# Patient Record
Sex: FEMALE | Race: WHITE | NOT HISPANIC OR LATINO | Employment: FULL TIME | URBAN - METROPOLITAN AREA
[De-identification: names, ages, dates, MRNs, and addresses within clinical notes are randomized per-mention and may not be internally consistent; named-entity substitution may affect disease eponyms.]

---

## 2019-01-10 ENCOUNTER — OFFICE VISIT (OUTPATIENT)
Dept: URGENT CARE | Facility: CLINIC | Age: 59
End: 2019-01-10
Payer: COMMERCIAL

## 2019-01-10 VITALS
HEIGHT: 61 IN | SYSTOLIC BLOOD PRESSURE: 120 MMHG | OXYGEN SATURATION: 98 % | TEMPERATURE: 98.7 F | RESPIRATION RATE: 18 BRPM | HEART RATE: 90 BPM | WEIGHT: 176 LBS | BODY MASS INDEX: 33.23 KG/M2 | DIASTOLIC BLOOD PRESSURE: 78 MMHG

## 2019-01-10 DIAGNOSIS — J01.10 ACUTE NON-RECURRENT FRONTAL SINUSITIS: Primary | ICD-10-CM

## 2019-01-10 PROCEDURE — 99203 OFFICE O/P NEW LOW 30 MIN: CPT | Performed by: PHYSICIAN ASSISTANT

## 2019-01-10 RX ORDER — CETIRIZINE HYDROCHLORIDE 10 MG/1
10 TABLET ORAL DAILY
Qty: 30 TABLET | Refills: 0 | Status: SHIPPED | OUTPATIENT
Start: 2019-01-10

## 2019-01-10 RX ORDER — FLUTICASONE PROPIONATE 50 MCG
2 SPRAY, SUSPENSION (ML) NASAL DAILY
Qty: 16 G | Refills: 0 | Status: SHIPPED | OUTPATIENT
Start: 2019-01-10

## 2019-01-10 RX ORDER — AMOXICILLIN AND CLAVULANATE POTASSIUM 875; 125 MG/1; MG/1
1 TABLET, FILM COATED ORAL EVERY 12 HOURS SCHEDULED
Qty: 14 TABLET | Refills: 0 | Status: SHIPPED | OUTPATIENT
Start: 2019-01-10 | End: 2019-01-17

## 2019-01-10 NOTE — PROGRESS NOTES
[address]        NAME: Hailey Moore is a 62 y o  female  : 1960    MRN: [id number]  DATE: January 10, 2019  TIME: 2:44 PM    Assessment and Plan   Acute non-recurrent frontal sinusitis [J01 10]  1  Acute non-recurrent frontal sinusitis  amoxicillin-clavulanate (AUGMENTIN) 875-125 mg per tablet    fluticasone (FLONASE) 50 mcg/act nasal spray    cetirizine (ZyrTEC) 10 mg tablet         Patient Instructions     Take antibiotic as directed until completed  Motrin and/or Tylenol as needed for fevers or aches and pains  Use medication as directed for symptom relief  Follow up with PCP in 3-5 days  Proceed to  ER if symptoms worsen  Chief Complaint     Chief Complaint   Patient presents with    Nasal Congestion     Nasal/snus congestion, sore throat  Onset 2 weeks ago         History of Present Illness       51-year-old female presents with sinus pain and pressure  Patient reports symptoms started 2 weeks ago and have been waxing and waning over the past 2 weeks  Has been using multiple over-the-counter remedies but is not getting any better  Denies any fevers or chills  No chest pain shortness of breath or coughing  Sinusitis   This is a new problem  The current episode started 1 to 4 weeks ago  The problem has been waxing and waning since onset  There has been no fever  The pain is moderate  Associated symptoms include headaches, sinus pressure and a sore throat  Pertinent negatives include no congestion or coughing  The treatment provided no relief  Review of Systems   Review of Systems   Constitutional: Negative  HENT: Positive for sinus pressure and sore throat  Negative for congestion  Eyes: Negative  Respiratory: Negative  Negative for cough  Cardiovascular: Negative  Gastrointestinal: Negative  Musculoskeletal: Negative  Skin: Negative  Neurological: Positive for headaches           Current Medications       Current Outpatient Prescriptions:    amoxicillin-clavulanate (AUGMENTIN) 875-125 mg per tablet, Take 1 tablet by mouth every 12 (twelve) hours for 7 days, Disp: 14 tablet, Rfl: 0    cetirizine (ZyrTEC) 10 mg tablet, Take 1 tablet (10 mg total) by mouth daily, Disp: 30 tablet, Rfl: 0    fluticasone (FLONASE) 50 mcg/act nasal spray, 2 sprays into each nostril daily, Disp: 16 g, Rfl: 0    Current Allergies     Allergies as of 01/10/2019 - Reviewed 01/10/2019   Allergen Reaction Noted    Demerol [meperidine] Vomiting 01/10/2019            The following portions of the patient's history were reviewed and updated as appropriate: allergies, current medications, past family history, past medical history, past social history, past surgical history and problem list      Past Medical History:   Diagnosis Date    Known health problems: none        Past Surgical History:   Procedure Laterality Date    APPENDECTOMY      HYSTERECTOMY      MENISCECTOMY         History reviewed  No pertinent family history  Medications have been verified  Objective   /78   Pulse 90   Temp 98 7 °F (37 1 °C) (Tympanic)   Resp 18   Ht 5' 1" (1 549 m)   Wt 79 8 kg (176 lb)   SpO2 98%   BMI 33 25 kg/m²        Physical Exam     Physical Exam   Constitutional: She is oriented to person, place, and time  She appears well-developed and well-nourished  No distress  HENT:   Head: Normocephalic and atraumatic  Right Ear: External ear normal    Left Ear: External ear normal    Nose: Right sinus exhibits maxillary sinus tenderness and frontal sinus tenderness  Left sinus exhibits maxillary sinus tenderness and frontal sinus tenderness  Mouth/Throat: Oropharynx is clear and moist  No oropharyngeal exudate  Eyes: Conjunctivae are normal  Right eye exhibits no discharge  Left eye exhibits no discharge  Neck: Normal range of motion  Neck supple  Cardiovascular: Normal rate, regular rhythm, normal heart sounds and intact distal pulses      No murmur heard   Pulmonary/Chest: Effort normal and breath sounds normal  No respiratory distress  She has no wheezes  She has no rales  Abdominal: Soft  Bowel sounds are normal  There is no tenderness  Musculoskeletal: Normal range of motion  Lymphadenopathy:     She has no cervical adenopathy  Neurological: She is alert and oriented to person, place, and time  Skin: Skin is warm and dry  Psychiatric: She has a normal mood and affect  Nursing note and vitals reviewed

## 2019-01-10 NOTE — PATIENT INSTRUCTIONS
Take antibiotic as directed until completed  Motrin and/or Tylenol as needed for fevers or aches and pains  Use medication as directed for symptom relief  Follow up with PCP in 3-5 days  Proceed to  ER if symptoms worsen  Sinusitis   AMBULATORY CARE:   Sinusitis  is inflammation or infection of your sinuses  It is most often caused by a virus  Acute sinusitis may last up to 12 weeks  Chronic sinusitis lasts longer than 12 weeks  Recurrent sinusitis means you have 4 or more times in 1 year  Common symptoms include the following:   · Fever    · Pain, pressure, redness, or swelling around the forehead, cheeks, or eyes    · Thick yellow or green discharge from your nose    · Tenderness when you touch your face over your sinuses    · Dry cough that happens mostly at night or when you lie down    · Headache and face pain that is worse when you lean forward    · Tooth pain, or pain when you chew  Seek care immediately if:   · Your eye and eyelid are red, swollen, and painful  · You cannot open your eye  · You have vision changes, such as double vision  · Your eyeball bulges out or you cannot move your eye  · You are more sleepy than normal, or you notice changes in your ability to think, move, or talk  · You have a stiff neck, a fever, or a bad headache  · You have swelling of your forehead or scalp  Contact your healthcare provider if:   · Your symptoms do not improve after 3 days  · Your symptoms do not go away after 10 days  · You have nausea and are vomiting  · Your nose is bleeding  · You have questions or concerns about your condition or care  Treatment for sinusitis:  Your symptoms may go away on their own  Your healthcare provider may recommend watchful waiting for up to 10 days before starting antibiotics  You may  need any of the following:  · Acetaminophen  decreases pain and fever  It is available without a doctor's order  Ask how much to take and how often to take it  Follow directions  Read the labels of all other medicines you are using to see if they also contain acetaminophen, or ask your doctor or pharmacist  Acetaminophen can cause liver damage if not taken correctly  Do not use more than 4 grams (4,000 milligrams) total of acetaminophen in one day  · NSAIDs , such as ibuprofen, help decrease swelling, pain, and fever  This medicine is available with or without a doctor's order  NSAIDs can cause stomach bleeding or kidney problems in certain people  If you take blood thinner medicine, always ask your healthcare provider if NSAIDs are safe for you  Always read the medicine label and follow directions  · Nasal steroid sprays  may help decrease inflammation in your nose and sinuses  · Decongestants  help reduce swelling and drain mucus in the nose and sinuses  They may help you breathe easier  · Antihistamines  help dry mucus in the nose and relieve sneezing  · Antibiotics  help treat or prevent a bacterial infection  · Take your medicine as directed  Contact your healthcare provider if you think your medicine is not helping or if you have side effects  Tell him or her if you are allergic to any medicine  Keep a list of the medicines, vitamins, and herbs you take  Include the amounts, and when and why you take them  Bring the list or the pill bottles to follow-up visits  Carry your medicine list with you in case of an emergency  Self-care:   · Rinse your sinuses  Use a sinus rinse device to rinse your nasal passages with a saline (salt water) solution or distilled water  Do not use tap water  This will help thin the mucus in your nose and rinse away pollen and dirt  It will also help reduce swelling so you can breathe normally  Ask your healthcare provider how often to do this  · Breathe in steam   Heat a bowl of water until you see steam  Lean over the bowl and make a tent over your head with a large towel  Breathe deeply for about 20 minutes   Be careful not to get too close to the steam or burn yourself  Do this 3 times a day  You can also breathe deeply when you take a hot shower  · Sleep with your head elevated  Place an extra pillow under your head before you go to sleep to help your sinuses drain  · Drink liquids as directed  Ask your healthcare provider how much liquid to drink each day and which liquids are best for you  Liquids will thin the mucus in your nose and help it drain  Avoid drinks that contain alcohol or caffeine  · Do not smoke, and avoid secondhand smoke  Nicotine and other chemicals in cigarettes and cigars can make your symptoms worse  Ask your healthcare provider for information if you currently smoke and need help to quit  E-cigarettes or smokeless tobacco still contain nicotine  Talk to your healthcare provider before you use these products  Prevent the spread of germs that cause sinusitis:  Wash your hands often with soap and water  Wash your hands after you use the bathroom, change a child's diaper, or sneeze  Wash your hands before you prepare or eat food  Follow up with your healthcare provider as directed: You may be referred to an ear, nose, and throat specialist  Write down your questions so you remember to ask them during your visits  © 2017 [facility] Information is for End User's use only and may not be sold, redistributed or otherwise used for commercial purposes  All illustrations and images included in CareNotes® are the copyrighted property of A D A M [employer]  or Ив[employer]ham  The above information is an  only  It is not intended as medical advice for individual conditions or treatments  Talk to your doctor, nurse or pharmacist before following any medical regimen to see if it is safe and effective for you

## 2023-03-24 ENCOUNTER — ANESTHESIA EVENT (OUTPATIENT)
Dept: GASTROENTEROLOGY | Facility: HOSPITAL | Age: 63
End: 2023-03-24

## 2023-03-24 ENCOUNTER — HOSPITAL ENCOUNTER (OUTPATIENT)
Dept: GASTROENTEROLOGY | Facility: HOSPITAL | Age: 63
Setting detail: OUTPATIENT SURGERY
End: 2023-03-24
Attending: INTERNAL MEDICINE

## 2023-03-24 ENCOUNTER — ANESTHESIA (OUTPATIENT)
Dept: GASTROENTEROLOGY | Facility: HOSPITAL | Age: 63
End: 2023-03-24

## 2023-03-24 VITALS
RESPIRATION RATE: 20 BRPM | TEMPERATURE: 98 F | SYSTOLIC BLOOD PRESSURE: 115 MMHG | DIASTOLIC BLOOD PRESSURE: 67 MMHG | BODY MASS INDEX: 33.04 KG/M2 | WEIGHT: 175 LBS | HEIGHT: 61 IN | OXYGEN SATURATION: 99 % | HEART RATE: 76 BPM

## 2023-03-24 DIAGNOSIS — R68.81 EARLY SATIETY: ICD-10-CM

## 2023-03-24 DIAGNOSIS — K21.9 GASTRO-ESOPHAGEAL REFLUX DISEASE WITHOUT ESOPHAGITIS: ICD-10-CM

## 2023-03-24 DIAGNOSIS — R63.4 ABNORMAL WEIGHT LOSS: ICD-10-CM

## 2023-03-24 DIAGNOSIS — R14.0 BLOATING: ICD-10-CM

## 2023-03-24 DIAGNOSIS — R13.10 DYSPHAGIA, UNSPECIFIED: ICD-10-CM

## 2023-03-24 RX ORDER — SODIUM CHLORIDE, SODIUM LACTATE, POTASSIUM CHLORIDE, CALCIUM CHLORIDE 600; 310; 30; 20 MG/100ML; MG/100ML; MG/100ML; MG/100ML
INJECTION, SOLUTION INTRAVENOUS CONTINUOUS PRN
Status: DISCONTINUED | OUTPATIENT
Start: 2023-03-24 | End: 2023-03-24

## 2023-03-24 RX ORDER — SODIUM CHLORIDE, SODIUM LACTATE, POTASSIUM CHLORIDE, CALCIUM CHLORIDE 600; 310; 30; 20 MG/100ML; MG/100ML; MG/100ML; MG/100ML
75 INJECTION, SOLUTION INTRAVENOUS CONTINUOUS
Status: CANCELLED | OUTPATIENT
Start: 2023-03-24

## 2023-03-24 RX ORDER — PROPOFOL 10 MG/ML
INJECTION, EMULSION INTRAVENOUS AS NEEDED
Status: DISCONTINUED | OUTPATIENT
Start: 2023-03-24 | End: 2023-03-24

## 2023-03-24 RX ORDER — LIDOCAINE HYDROCHLORIDE 20 MG/ML
INJECTION, SOLUTION EPIDURAL; INFILTRATION; INTRACAUDAL; PERINEURAL AS NEEDED
Status: DISCONTINUED | OUTPATIENT
Start: 2023-03-24 | End: 2023-03-24

## 2023-03-24 RX ADMIN — PROPOFOL 20 MG: 10 INJECTION, EMULSION INTRAVENOUS at 11:19

## 2023-03-24 RX ADMIN — PROPOFOL 150 MG: 10 INJECTION, EMULSION INTRAVENOUS at 11:16

## 2023-03-24 RX ADMIN — PROPOFOL 20 MG: 10 INJECTION, EMULSION INTRAVENOUS at 11:18

## 2023-03-24 RX ADMIN — LIDOCAINE HYDROCHLORIDE 100 MG: 20 INJECTION, SOLUTION EPIDURAL; INFILTRATION; INTRACAUDAL; PERINEURAL at 11:16

## 2023-03-24 RX ADMIN — PROPOFOL 10 MG: 10 INJECTION, EMULSION INTRAVENOUS at 11:21

## 2023-03-24 RX ADMIN — PROPOFOL 10 MG: 10 INJECTION, EMULSION INTRAVENOUS at 11:17

## 2023-03-24 RX ADMIN — SODIUM CHLORIDE, SODIUM LACTATE, POTASSIUM CHLORIDE, AND CALCIUM CHLORIDE: .6; .31; .03; .02 INJECTION, SOLUTION INTRAVENOUS at 11:05

## 2023-03-24 NOTE — H&P
History and Physical - [facility] at [address] 58 y o  female MRN: [id number]                  HPI: Deanna Chery is a 58y o  year old female who presents for EGD for evaluation of GERD, dysphagia, abdominal bloating  REVIEW OF SYSTEMS: Per the HPI, and otherwise unremarkable  Historical Information   Past Medical History:   Diagnosis Date   • GERD (gastroesophageal reflux disease)    • Known health problems: none    • PONV (postoperative nausea and vomiting)      Past Surgical History:   Procedure Laterality Date   • APPENDECTOMY     • HYSTERECTOMY     • MENISCECTOMY       Social History   Social History     Substance and Sexual Activity   Alcohol Use Yes    Comment: occasional     Social History     Substance and Sexual Activity   Drug Use No     Social History     Tobacco Use   Smoking Status Never   Smokeless Tobacco Never     History reviewed  No pertinent family history  Meds/Allergies     (Not in a hospital admission)      Allergies   Allergen Reactions   • Demerol [Meperidine] Vomiting   • Oxycodone Vomiting       Objective     Blood pressure 124/69, pulse 77, temperature 97 7 °F (36 5 °C), temperature source Oral, resp  rate 18, height 5' 1" (1 549 m), weight 79 4 kg (175 lb), SpO2 99 %  PHYSICAL EXAM    Gen: NAD  CV: RRR  CHEST: Clear  ABD: soft, NT/ND  EXT: no edema      ASSESSMENT/PLAN:  This is a 58y o  year old female here for EGD for evaluation of GERD, dysphagia, abdominal bloating     Patient is stable and optimized for procedure      PLAN:   Procedure: EGD

## 2023-03-24 NOTE — ANESTHESIA PREPROCEDURE EVALUATION
Procedure:  EGD    Relevant Problems   ANESTHESIA   (+) PONV (postoperative nausea and vomiting)      GI/HEPATIC   (+) GERD (gastroesophageal reflux disease)      Other   (+) Obesity        Physical Exam    Airway    Mallampati score: II  TM Distance: >3 FB  Neck ROM: full     Dental   No notable dental hx     Cardiovascular      Pulmonary      Other Findings        Anesthesia Plan  ASA Score- 2     Anesthesia Type- IV sedation with anesthesia with ASA Monitors  Additional Monitors:   Airway Plan:     Comment: Back up GA  Plan Factors-Exercise tolerance (METS): >4 METS  Chart reviewed  Patient summary reviewed  Patient is not a current smoker  Induction-     Postoperative Plan-     Informed Consent- Anesthetic plan and risks discussed with patient  I personally reviewed this patient with the CRNA  Discussed and agreed on the Anesthesia Plan with the CRNA  Neela Chávez

## 2023-03-24 NOTE — ANESTHESIA POSTPROCEDURE EVALUATION
Post-Op Assessment Note    CV Status:  Stable    Pain management: satisfactory to patient     Mental Status:  Alert and awake   Hydration Status:  Stable   PONV Controlled:  None   Airway Patency:  Patent      Post Op Vitals Reviewed: Yes      Staff: CRNA         No notable events documented      BP   113/64   Temp   97 7   Pulse  88   Resp   16   SpO2   99

## 2023-08-24 ENCOUNTER — HOSPITAL ENCOUNTER (EMERGENCY)
Facility: HOSPITAL | Age: 63
Discharge: HOME | End: 2023-08-24
Attending: EMERGENCY MEDICINE
Payer: COMMERCIAL

## 2023-08-24 ENCOUNTER — APPOINTMENT (EMERGENCY)
Dept: RADIOLOGY | Facility: HOSPITAL | Age: 63
End: 2023-08-24
Payer: COMMERCIAL

## 2023-08-24 VITALS
OXYGEN SATURATION: 99 % | RESPIRATION RATE: 20 BRPM | HEART RATE: 81 BPM | SYSTOLIC BLOOD PRESSURE: 178 MMHG | HEIGHT: 61 IN | WEIGHT: 177 LBS | DIASTOLIC BLOOD PRESSURE: 70 MMHG | BODY MASS INDEX: 33.42 KG/M2 | TEMPERATURE: 97.8 F

## 2023-08-24 DIAGNOSIS — S51.812A LACERATION OF LEFT FOREARM, INITIAL ENCOUNTER: Primary | ICD-10-CM

## 2023-08-24 PROCEDURE — 99284 EMERGENCY DEPT VISIT MOD MDM: CPT | Performed by: PHYSICIAN ASSISTANT

## 2023-08-24 PROCEDURE — 99284 EMERGENCY DEPT VISIT MOD MDM: CPT

## 2023-08-24 PROCEDURE — 90471 IMMUNIZATION ADMIN: CPT

## 2023-08-24 PROCEDURE — 73080 X-RAY EXAM OF ELBOW: CPT

## 2023-08-24 PROCEDURE — 12002 RPR S/N/AX/GEN/TRNK2.6-7.5CM: CPT | Performed by: PHYSICIAN ASSISTANT

## 2023-08-24 PROCEDURE — 90715 TDAP VACCINE 7 YRS/> IM: CPT | Performed by: PHYSICIAN ASSISTANT

## 2023-08-24 RX ORDER — LIDOCAINE HYDROCHLORIDE 10 MG/ML
10 INJECTION, SOLUTION EPIDURAL; INFILTRATION; INTRACAUDAL; PERINEURAL ONCE
Status: COMPLETED | OUTPATIENT
Start: 2023-08-24 | End: 2023-08-24

## 2023-08-24 RX ADMIN — TETANUS TOXOID, REDUCED DIPHTHERIA TOXOID AND ACELLULAR PERTUSSIS VACCINE, ADSORBED 0.5 ML: 5; 2.5; 8; 8; 2.5 SUSPENSION INTRAMUSCULAR at 14:29

## 2023-08-24 RX ADMIN — LIDOCAINE HYDROCHLORIDE 10 ML: 10 INJECTION, SOLUTION EPIDURAL; INFILTRATION; INTRACAUDAL; PERINEURAL at 14:24

## 2023-08-24 NOTE — DISCHARGE INSTRUCTIONS
5 stitiches will need to be removed in 12 days     These keep the area clean and dry with bandage intact for 24 hours. Then apply bacitracin. Steri-Strips will fall off when they are ready to.

## 2023-08-24 NOTE — ED PROVIDER NOTES
History  Chief Complaint   Patient presents with   • Fall     Pt. Tripped and fell onto broken crate on porch, suffered a lacration to left underside of her forearm, bleeding controlled     Patient is a 80-year-old female who presents emerged department today with a chief complaint of a left forearm laceration. Patient states that she tripped over her shoes falling and injuring her left forearm on a plastic tote. She has a laceration to the left forearm. Last tetanus vaccination was just over 5 years ago. Did not hit her head or lose consciousness. Is left-handed. Is able to move the extremity and no other pain. No headache loss of consciousness. Fall  Mechanism of injury: fall    Injury location:  Shoulder/arm  Shoulder/arm injury location:  L forearm  Incident location:  Home  Arrived directly from scene: yes    Fall:     Fall occurred:  Tripped and walking    Impact surface: plastic tote. Suspicion of alcohol use: no    Suspicion of drug use: no    Tetanus status:  Out of date  Prior to arrival data:     Bystander interventions:  None    Patient ambulatory at scene: yes      Blood loss:  Minimal    Responsiveness at scene:  Alert    Orientation at scene:  Person    Loss of consciousness: no      Amnesic to event: no      Breathing interventions:  None    IV access status:  None    IO access:  None    Fluids administered:  None    Cardiac interventions:  None  Associated symptoms: no abdominal pain, no back pain, no chest pain, no seizures and no vomiting        Prior to Admission Medications   Prescriptions Last Dose Informant Patient Reported?  Taking?   acetaminophen (TYLENOL) 325 mg tablet Not Taking  Yes No   Sig: Take 650 mg by mouth every 6 (six) hours as needed for mild pain   Patient not taking: Reported on 8/24/2023   cetirizine (ZyrTEC) 10 mg tablet Not Taking  No No   Sig: Take 1 tablet (10 mg total) by mouth daily   Patient not taking: Reported on 8/24/2023   fluticasone (FLONASE) 50 mcg/act nasal spray Not Taking  No No   Si sprays into each nostril daily   Patient not taking: Reported on 2023      Facility-Administered Medications: None       Past Medical History:   Diagnosis Date   • GERD (gastroesophageal reflux disease)    • Known health problems: none    • PONV (postoperative nausea and vomiting)        Past Surgical History:   Procedure Laterality Date   • APPENDECTOMY     • HYSTERECTOMY     • MENISCECTOMY         History reviewed. No pertinent family history. I have reviewed and agree with the history as documented. E-Cigarette/Vaping   • E-Cigarette Use Never User      E-Cigarette/Vaping Substances   • Nicotine No    • THC No    • CBD No    • Flavoring No      Social History     Tobacco Use   • Smoking status: Never     Passive exposure: Never   • Smokeless tobacco: Never   Vaping Use   • Vaping Use: Never used   Substance Use Topics   • Alcohol use: Yes     Comment: occasional   • Drug use: No       Review of Systems   Constitutional: Negative for chills and fever. HENT: Negative for ear pain and sore throat. Eyes: Negative for pain and visual disturbance. Respiratory: Negative for cough, shortness of breath and wheezing. Cardiovascular: Negative for chest pain, palpitations and leg swelling. Gastrointestinal: Negative for abdominal pain and vomiting. Genitourinary: Negative for dysuria and hematuria. Musculoskeletal: Negative for arthralgias and back pain. Skin: Negative for color change and rash. Neurological: Negative for dizziness, seizures and syncope. All other systems reviewed and are negative. Physical Exam  Physical Exam  Vitals and nursing note reviewed. Constitutional:       General: She is not in acute distress. Appearance: She is well-developed. HENT:      Head: Normocephalic and atraumatic.       Right Ear: External ear normal.      Left Ear: External ear normal.   Eyes:      Extraocular Movements: Extraocular movements intact. Pupils: Pupils are equal, round, and reactive to light. Cardiovascular:      Rate and Rhythm: Normal rate and regular rhythm. Pulses: Normal pulses. Heart sounds: No murmur heard. Pulmonary:      Effort: Pulmonary effort is normal. No respiratory distress. Breath sounds: Normal breath sounds. No wheezing. Abdominal:      General: Bowel sounds are normal.      Palpations: Abdomen is soft. There is no mass. Tenderness: There is no abdominal tenderness. There is no right CVA tenderness, left CVA tenderness or rebound. Hernia: No hernia is present. Musculoskeletal:        Arms:       Cervical back: Normal range of motion and neck supple. Right lower leg: No edema. Left lower leg: No edema. Skin:     General: Skin is warm and dry. Capillary Refill: Capillary refill takes less than 2 seconds. Neurological:      General: No focal deficit present. Mental Status: She is alert and oriented to person, place, and time.       Coordination: Coordination normal.   Psychiatric:         Behavior: Behavior normal.         Vital Signs  ED Triage Vitals [08/24/23 1414]   Temperature Pulse Respirations Blood Pressure SpO2   97.8 °F (36.6 °C) 83 20 (!) 178/70 99 %      Temp Source Heart Rate Source Patient Position - Orthostatic VS BP Location FiO2 (%)   Temporal Monitor Sitting Right arm --      Pain Score       --           Vitals:    08/24/23 1414 08/24/23 1415   BP: (!) 178/70 (!) 178/70   Pulse: 83 81   Patient Position - Orthostatic VS: Sitting          Visual Acuity  Visual Acuity    Flowsheet Row Most Recent Value   L Pupil Size (mm) 3   R Pupil Size (mm) 3          ED Medications  Medications   lidocaine (PF) (XYLOCAINE-MPF) 1 % injection 10 mL (10 mL Infiltration Given by Other 8/24/23 1424)   tetanus-diphtheria-acellular pertussis (BOOSTRIX) IM injection 0.5 mL (0.5 mL Intramuscular Given 8/24/23 1429)       Diagnostic Studies  Results Reviewed     None XR elbow 3+ vw LEFT   Final Result by Clarence De León MD (08/24 7114)      No acute osseous abnormality. Workstation performed: [id number]                    Procedures  Universal Protocol:  Procedure performed by:  Consent: Verbal consent obtained. Risks and benefits: risks, benefits and alternatives were discussed  Consent given by: patient  Timeout called at: 8/24/2023 5:39 PM.  Laceration repair    Date/Time: 8/24/2023 5:39 PM    Performed by: Kerrie Mcdonald PA-C  Authorized by: Kerrie Mcdonald PA-C  Body area: upper extremity  Location details: left lower arm  Laceration length: 4 cm  Foreign bodies: no foreign bodies  Tendon involvement: none  Nerve involvement: none  Anesthesia: local infiltration    Anesthesia:  Local Anesthetic: lidocaine 1% with epinephrine    Sedation:  Patient sedated: no        Procedure Details:  Irrigation solution: saline  Irrigation method: tap  Amount of cleaning: standard  Debridement: minimal  Degree of undermining: none  Skin closure: Ethilon  Number of sutures: 5  Technique: simple  Approximation: close  Approximation difficulty: simple  Dressing: 4x4 sterile gauze and gauze roll               ED Course                                             Medical Decision Making  Patient is a 41-year-old female who presents emerged department today with a chief complaint of a left forearm laceration. Patient states that she tripped over her shoes falling and injuring her left forearm on a plastic tote. She has a laceration to the left forearm. Last tetanus vaccination was just over 5 years ago. Did not hit her head or lose consciousness. Is left-handed. Is able to move the extremity and no other pain. No headache loss of consciousness. The patient's x-ray was unremarkable. Had laceration repaired updated tetanus. Patient tolerated the procedure well had 5 stitches placed recommended to have these removed in 12 days.     Amount and/or Complexity of Data Reviewed  Radiology: ordered. Decision-making details documented in ED Course. Risk  Prescription drug management. Disposition  Final diagnoses:   Laceration of left forearm, initial encounter     Time reflects when diagnosis was documented in both MDM as applicable and the Disposition within this note     Time User Action Codes Description Comment    8/24/2023  3:34 PM Cynthia Bryan Add [R67.321Q] Laceration of left forearm, initial encounter       ED Disposition     ED Disposition   Discharge    Condition   Stable    Date/Time   Thu Aug 24, 2023  3:34 PM    Comment   Marta Born discharge to home/self care. Follow-up Information    None         Discharge Medication List as of 8/24/2023  3:34 PM      CONTINUE these medications which have NOT CHANGED    Details   acetaminophen (TYLENOL) 325 mg tablet Take 650 mg by mouth every 6 (six) hours as needed for mild pain, Historical Med      cetirizine (ZyrTEC) 10 mg tablet Take 1 tablet (10 mg total) by mouth daily, Starting Thu 1/10/2019, Normal      fluticasone (FLONASE) 50 mcg/act nasal spray 2 sprays into each nostril daily, Starting Thu 1/10/2019, Normal             No discharge procedures on file.     PDMP Review     None          ED Provider  Electronically Signed by           Cristel Foley PA-C  08/24/23 5117

## 2024-02-16 DIAGNOSIS — I47.10 SUPRAVENTRICULAR TACHYCARDIA, UNSPECIFIED: ICD-10-CM

## 2024-03-07 ENCOUNTER — HOSPITAL ENCOUNTER (OUTPATIENT)
Dept: NON INVASIVE DIAGNOSTICS | Facility: HOSPITAL | Age: 64
Discharge: HOME | End: 2024-03-07
Attending: INTERNAL MEDICINE
Payer: COMMERCIAL

## 2024-03-07 VITALS
SYSTOLIC BLOOD PRESSURE: 178 MMHG | DIASTOLIC BLOOD PRESSURE: 70 MMHG | HEIGHT: 61 IN | BODY MASS INDEX: 33.42 KG/M2 | WEIGHT: 177.03 LBS

## 2024-03-07 DIAGNOSIS — I47.10 SUPRAVENTRICULAR TACHYCARDIA, UNSPECIFIED: ICD-10-CM

## 2024-03-07 LAB
AORTIC ROOT: 3 CM
AORTIC VALVE MEAN VELOCITY: 7.3 M/S
APICAL FOUR CHAMBER EJECTION FRACTION: 61 %
ASCENDING AORTA: 2.8 CM
AV AREA BY CONTINUOUS VTI: 2.5 CM2
AV AREA PEAK VELOCITY: 2.4 CM2
AV LVOT MEAN GRADIENT: 2 MMHG
AV LVOT PEAK GRADIENT: 4 MMHG
AV MEAN GRADIENT: 3 MMHG
AV PEAK GRADIENT: 5 MMHG
AV VALVE AREA: 2.46 CM2
AV VELOCITY RATIO: 0.83
BSA FOR ECHO PROCEDURE: 1.78 M2
DOP CALC AO PEAK VEL: 1.13 M/S
DOP CALC AO VTI: 27.11 CM
DOP CALC LVOT AREA: 2.83 CM2
DOP CALC LVOT CARDIAC INDEX: 2.29 L/MIN/M2
DOP CALC LVOT CARDIAC OUTPUT: 4.08 L/MIN
DOP CALC LVOT DIAMETER: 1.9 CM
DOP CALC LVOT PEAK VEL VTI: 23.51 CM
DOP CALC LVOT PEAK VEL: 0.94 M/S
DOP CALC LVOT STROKE INDEX: 36 ML/M2
DOP CALC LVOT STROKE VOLUME: 66.62
E WAVE DECELERATION TIME: 138 MS
E/A RATIO: 0.86
FRACTIONAL SHORTENING: 30 (ref 28–44)
INTERVENTRICULAR SEPTUM IN DIASTOLE (PARASTERNAL SHORT AXIS VIEW): 0.8 CM
INTERVENTRICULAR SEPTUM: 0.8 CM (ref 0.6–1.1)
LAAS-AP2: 22.9 CM2
LAAS-AP4: 18.7 CM2
LEFT ATRIUM SIZE: 2.8 CM
LEFT ATRIUM VOLUME (MOD BIPLANE): 85 ML
LEFT ATRIUM VOLUME INDEX (MOD BIPLANE): 47.8 ML/M2
LEFT INTERNAL DIMENSION IN SYSTOLE: 3.2 CM (ref 2.1–4)
LEFT VENTRICLE DIASTOLIC VOLUME (MOD BIPLANE): 103 ML
LEFT VENTRICLE DIASTOLIC VOLUME INDEX (MOD BIPLANE): 57.9 ML/M2
LEFT VENTRICLE SYSTOLIC VOLUME (MOD BIPLANE): 40 ML
LEFT VENTRICLE SYSTOLIC VOLUME INDEX (MOD BIPLANE): 22.5 ML/M2
LEFT VENTRICULAR INTERNAL DIMENSION IN DIASTOLE: 4.6 CM (ref 3.5–6)
LEFT VENTRICULAR POSTERIOR WALL IN END DIASTOLE: 0.7 CM
LEFT VENTRICULAR STROKE VOLUME: 57 ML
LV EF: 61 %
LVSV (TEICH): 57 ML
MV E'TISSUE VEL-LAT: 11 CM/S
MV E'TISSUE VEL-SEP: 9 CM/S
MV PEAK A VEL: 0.79 M/S
MV PEAK E VEL: 68 CM/S
MV STENOSIS PRESSURE HALF TIME: 40 MS
MV VALVE AREA P 1/2 METHOD: 5.5
RA PRESSURE ESTIMATED: 3 MMHG
RIGHT ATRIUM AREA SYSTOLE A4C: 17.7 CM2
RIGHT VENTRICLE ID DIMENSION: 3.6 CM
RV PSP: 24 MMHG
SL CV LEFT ATRIUM LENGTH A2C: 6 CM
SL CV LV EF: 55
SL CV PED ECHO LEFT VENTRICLE DIASTOLIC VOLUME (MOD BIPLANE) 2D: 98 ML
SL CV PED ECHO LEFT VENTRICLE SYSTOLIC VOLUME (MOD BIPLANE) 2D: 41 ML
TR MAX PG: 21 MMHG
TR PEAK VELOCITY: 2.3 M/S
TRICUSPID ANNULAR PLANE SYSTOLIC EXCURSION: 2.3 CM
TRICUSPID VALVE PEAK REGURGITATION VELOCITY: 2.29 M/S

## 2024-03-07 PROCEDURE — 93306 TTE W/DOPPLER COMPLETE: CPT

## 2024-06-17 ENCOUNTER — APPOINTMENT (OUTPATIENT)
Dept: RADIOLOGY | Facility: HOSPITAL | Age: 64
End: 2024-06-17
Payer: COMMERCIAL

## 2024-06-17 ENCOUNTER — HOSPITAL ENCOUNTER (EMERGENCY)
Facility: HOSPITAL | Age: 64
Discharge: HOME | End: 2024-06-17
Attending: STUDENT IN AN ORGANIZED HEALTH CARE EDUCATION/TRAINING PROGRAM | Admitting: STUDENT IN AN ORGANIZED HEALTH CARE EDUCATION/TRAINING PROGRAM
Payer: COMMERCIAL

## 2024-06-17 VITALS
HEART RATE: 82 BPM | SYSTOLIC BLOOD PRESSURE: 109 MMHG | DIASTOLIC BLOOD PRESSURE: 60 MMHG | HEIGHT: 61 IN | OXYGEN SATURATION: 98 % | TEMPERATURE: 99.4 F | BODY MASS INDEX: 33.04 KG/M2 | WEIGHT: 175 LBS | RESPIRATION RATE: 18 BRPM

## 2024-06-17 DIAGNOSIS — J20.9 ACUTE BRONCHITIS, UNSPECIFIED ORGANISM: Primary | ICD-10-CM

## 2024-06-17 LAB
ATRIAL RATE: 84 BPM
FLUAV RNA RESP QL NAA+PROBE: NEGATIVE
FLUBV RNA RESP QL NAA+PROBE: NEGATIVE
P AXIS: 78 DEGREES
PR INTERVAL: 144 MS
QRS AXIS: 63 DEGREES
QRSD INTERVAL: 70 MS
QT INTERVAL: 352 MS
QTC INTERVAL: 415 MS
RSV RNA RESP QL NAA+PROBE: NEGATIVE
SARS-COV-2 RNA RESP QL NAA+PROBE: NEGATIVE
T WAVE AXIS: 57 DEGREES
VENTRICULAR RATE: 84 BPM

## 2024-06-17 PROCEDURE — 99285 EMERGENCY DEPT VISIT HI MDM: CPT

## 2024-06-17 PROCEDURE — 93005 ELECTROCARDIOGRAM TRACING: CPT

## 2024-06-17 PROCEDURE — 93010 ELECTROCARDIOGRAM REPORT: CPT | Performed by: INTERNAL MEDICINE

## 2024-06-17 PROCEDURE — 99284 EMERGENCY DEPT VISIT MOD MDM: CPT | Performed by: STUDENT IN AN ORGANIZED HEALTH CARE EDUCATION/TRAINING PROGRAM

## 2024-06-17 PROCEDURE — 0241U HB NFCT DS VIR RESP RNA 4 TRGT: CPT | Performed by: STUDENT IN AN ORGANIZED HEALTH CARE EDUCATION/TRAINING PROGRAM

## 2024-06-17 PROCEDURE — 71046 X-RAY EXAM CHEST 2 VIEWS: CPT

## 2024-06-17 RX ORDER — PREDNISONE 20 MG/1
40 TABLET ORAL DAILY
Qty: 8 TABLET | Refills: 0 | Status: SHIPPED | OUTPATIENT
Start: 2024-06-17 | End: 2024-06-21

## 2024-06-17 RX ORDER — PREDNISONE 20 MG/1
40 TABLET ORAL ONCE
Status: COMPLETED | OUTPATIENT
Start: 2024-06-17 | End: 2024-06-17

## 2024-06-17 RX ORDER — ALBUTEROL SULFATE 90 UG/1
2 AEROSOL, METERED RESPIRATORY (INHALATION) ONCE
Status: COMPLETED | OUTPATIENT
Start: 2024-06-17 | End: 2024-06-17

## 2024-06-17 RX ORDER — DEXTROMETHORPHAN HYDROBROMIDE AND PROMETHAZINE HYDROCHLORIDE 15; 6.25 MG/5ML; MG/5ML
5 SYRUP ORAL 4 TIMES DAILY PRN
Qty: 118 ML | Refills: 0 | Status: SHIPPED | OUTPATIENT
Start: 2024-06-17

## 2024-06-17 RX ADMIN — ALBUTEROL SULFATE 2 PUFF: 90 AEROSOL, METERED RESPIRATORY (INHALATION) at 17:56

## 2024-06-17 RX ADMIN — PREDNISONE 40 MG: 20 TABLET ORAL at 17:58

## 2024-06-17 NOTE — ED PROVIDER NOTES
History  Chief Complaint   Patient presents with    Shortness of Breath     Patient reports she started w/ post nasal drip approx 1 week ago that has developed into wheezing and shortness of breath. Patient reports she has a productive cough of white mucous. Has been taking leftover amoxicillin.      63 year old F. Hx of bronchitis. Presents with URI symptoms, cough, wheezing. Worsening over the last 6 days. +sick contacts. Was taking leftover amoxicillin (8 doses) without improvement. Has been having decreased appetite but tolerating fluids. Denies documented fevers/chills. The patient states that she has been coughing up white phlegm. Clear nasal discharge. Denies smoking, hx of asthma/COPD.       History provided by:  Patient  URI  Presenting symptoms: congestion, cough, fatigue, rhinorrhea and sore throat    Presenting symptoms: no facial pain and no fever    Severity:  Moderate  Onset quality:  Gradual  Duration:  6 days  Progression:  Worsening  Chronicity:  New  Relieved by:  Nothing  Worsened by:  Nothing  Ineffective treatments:  OTC medications  Associated symptoms: headaches, sneezing and wheezing    Associated symptoms: no arthralgias, no myalgias and no sinus pain    Risk factors: sick contacts      Prior to Admission Medications   Prescriptions Last Dose Informant Patient Reported? Taking?   acetaminophen (TYLENOL) 325 mg tablet   Yes No   Sig: Take 650 mg by mouth every 6 (six) hours as needed for mild pain   Patient not taking: Reported on 2023   cetirizine (ZyrTEC) 10 mg tablet   No No   Sig: Take 1 tablet (10 mg total) by mouth daily   Patient not taking: Reported on 2023   fluticasone (FLONASE) 50 mcg/act nasal spray   No No   Si sprays into each nostril daily   Patient not taking: Reported on 2023      Facility-Administered Medications: None       Past Medical History:   Diagnosis Date    GERD (gastroesophageal reflux disease)     Known health problems: none     PONV  (postoperative nausea and vomiting)        Past Surgical History:   Procedure Laterality Date    APPENDECTOMY      HYSTERECTOMY      MENISCECTOMY         History reviewed. No pertinent family history.  I have reviewed and agree with the history as documented.    E-Cigarette/Vaping    E-Cigarette Use Never User      E-Cigarette/Vaping Substances    Nicotine No     THC No     CBD No     Flavoring No      Social History     Tobacco Use    Smoking status: Never     Passive exposure: Never    Smokeless tobacco: Never   Vaping Use    Vaping status: Never Used   Substance Use Topics    Alcohol use: Yes     Comment: occasional    Drug use: No       Review of Systems   Constitutional:  Positive for activity change, appetite change and fatigue. Negative for chills and fever.   HENT:  Positive for congestion, rhinorrhea, sneezing and sore throat. Negative for sinus pain.    Respiratory:  Positive for cough, chest tightness, shortness of breath and wheezing.    Cardiovascular:  Negative for chest pain and palpitations.   Gastrointestinal:  Negative for abdominal pain, diarrhea, nausea and vomiting.   Musculoskeletal:  Negative for arthralgias, back pain and myalgias.   Skin:  Negative for color change, pallor, rash and wound.   Neurological:  Positive for headaches. Negative for dizziness, syncope, weakness and light-headedness.   All other systems reviewed and are negative.    Physical Exam  Physical Exam  Vitals and nursing note reviewed.   Constitutional:       General: She is not in acute distress.     Appearance: She is not ill-appearing or toxic-appearing.   HENT:      Head: Normocephalic and atraumatic.      Mouth/Throat:      Mouth: Mucous membranes are moist.      Pharynx: No pharyngeal swelling or oropharyngeal exudate.   Eyes:      Extraocular Movements: Extraocular movements intact.   Cardiovascular:      Rate and Rhythm: Normal rate and regular rhythm.      Pulses: Normal pulses.      Heart sounds: No murmur  heard.  Pulmonary:      Effort: Pulmonary effort is normal. No tachypnea, accessory muscle usage or respiratory distress.      Breath sounds: Normal breath sounds. No decreased breath sounds, wheezing or rhonchi.   Chest:      Chest wall: No tenderness.   Abdominal:      General: Bowel sounds are normal.      Palpations: Abdomen is soft.      Tenderness: There is no abdominal tenderness. There is no guarding or rebound.   Musculoskeletal:      Cervical back: Neck supple.   Skin:     General: Skin is warm and dry.      Coloration: Skin is not cyanotic.      Findings: No ecchymosis or erythema.   Neurological:      General: No focal deficit present.      Mental Status: She is alert and oriented to person, place, and time.   Psychiatric:         Mood and Affect: Mood normal. Mood is not anxious.         Behavior: Behavior normal. Behavior is not agitated.         Vital Signs  ED Triage Vitals [06/17/24 1527]   Temperature Pulse Respirations Blood Pressure SpO2   99.4 °F (37.4 °C) 87 18 136/88 98 %      Temp Source Heart Rate Source Patient Position - Orthostatic VS BP Location FiO2 (%)   Temporal Monitor Lying Left arm --      Pain Score       No Pain           Vitals:    06/17/24 1710 06/17/24 1715 06/17/24 1730 06/17/24 1745   BP: 138/77 122/70 131/65 109/60   Pulse: 85 82 84 82   Patient Position - Orthostatic VS: Lying            Visual Acuity      ED Medications  Medications   albuterol (PROVENTIL HFA,VENTOLIN HFA) inhaler 2 puff (2 puffs Inhalation Given 6/17/24 1756)   predniSONE tablet 40 mg (40 mg Oral Given 6/17/24 1758)       Diagnostic Studies  Results Reviewed       Procedure Component Value Units Date/Time    FLU/RSV/COVID - if FLU/RSV clinically relevant [[id number]]  (Normal) Collected: 06/17/24 1532    Lab Status: Final result Specimen: Nares from Nose Updated: 06/17/24 1639     SARS-CoV-2 Negative     INFLUENZA A PCR Negative     INFLUENZA B PCR Negative     RSV PCR Negative    Narrative:      FOR  PEDIATRIC PATIENTS - copy/paste COVID Guidelines URL to browser: [email or web address]    SARS-CoV-2 assay is a Nucleic Acid Amplification assay intended for the  qualitative detection of nucleic acid from SARS-CoV-2 in nasopharyngeal  swabs. Results are for the presumptive identification of SARS-CoV-2 RNA.    Positive results are indicative of infection with SARS-CoV-2, the virus  causing COVID-19, but do not rule out bacterial infection or co-infection  with other viruses. Laboratories within the [place] and its  territories are required to report all positive results to the appropriate  public health authorities. Negative results do not preclude SARS-CoV-2  infection and should not be used as the sole basis for treatment or other  patient management decisions. Negative results must be combined with  clinical observations, patient history, and epidemiological information.  This test has not been FDA cleared or approved.    This test has been authorized by FDA under an Emergency Use Authorization  (EUA). This test is only authorized for the duration of time the  declaration that circumstances exist justifying the authorization of the  emergency use of an in vitro diagnostic tests for detection of SARS-CoV-2  virus and/or diagnosis of COVID-19 infection under section 564(b)(1) of  the Act, 21 U.S.C. 360bbb-3(b)(1), unless the authorization is terminated  or revoked sooner. The test has been validated but independent review by FDA  and CLIA is pending.    Test performed using [employer] GeneXpert: This RT-PCR assay targets N2,  a region unique to SARS-CoV-2. A conserved region in the E-gene was chosen  for pan-Sarbecovirus detection which includes SARS-CoV-2.    According to CMS-2020-01-R, this platform meets the definition of high-throughput technology.                   XR chest 2 views   Final Result by Shaista Malone MD (06/17 5519)      No acute  cardiopulmonary disease.               Workstation performed: [id number]                    Procedures  Procedures         ED Course                               SBIRT 22yo+      Flowsheet Row Most Recent Value   Initial Alcohol Screen: US AUDIT-C     1. How often do you have a drink containing alcohol? 0 Filed at: 06/17/2024 1530   2. How many drinks containing alcohol do you have on a typical day you are drinking?  0 Filed at: 06/17/2024 1530   3a. Male UNDER 65: How often do you have five or more drinks on one occasion? 0 Filed at: 06/17/2024 1530   3b. FEMALE Any Age, or MALE 65+: How often do you have 4 or more drinks on one occassion? 0 Filed at: 06/17/2024 1530   Audit-C Score 0 Filed at: 06/17/2024 1530   ELENO: How many times in the past year have you...    Used an illegal drug or used a prescription medication for non-medical reasons? Never Filed at: 06/17/2024 1530                      Medical Decision Making  This patient presents with cough, URI symptoms, intermittent wheezing.   Diagnostic considerations include bacterial sinusitis, bronchitis, pneumonia, strep pharyngitis, viral illness.     Vital signs reviewed.  No signs of respiratory distress.  Lung exam unremarkable.  The patient's history/clinical findings likely consistent with viral bronchitis.  Has been taking leftover amoxicillin without improvement.  Respiratory viral panel negative.  Chest x-ray without signs of pneumonia. Oral prednisone/cough suppressants prescribed.  Albuterol inhaler provided.  Other supportive care measures/return precautions were discussed with the patient.  She expressed understanding.  All questions addressed.  Stable for discharge.        Problems Addressed:  Acute bronchitis, unspecified organism: acute illness or injury    Amount and/or Complexity of Data Reviewed  Labs: ordered. Decision-making details documented in ED Course.  Radiology: ordered and independent interpretation performed. Decision-making details  documented in ED Course.    Risk  Prescription drug management.             Disposition  Final diagnoses:   Acute bronchitis, unspecified organism     Time reflects when diagnosis was documented in both MDM as applicable and the Disposition within this note       Time User Action Codes Description Comment    6/17/2024  5:55 PM Christian Luz [J20.9] Acute bronchitis, unspecified organism           ED Disposition       ED Disposition   Discharge    Condition   Stable    Date/Time   Mon Jun 17, 2024 9735    Comment   Brooklyn Vazquez discharge to home/self care.                   Follow-up Information    None         Discharge Medication List as of 6/17/2024  5:58 PM        START taking these medications    Details   predniSONE 20 mg tablet Take 2 tablets (40 mg total) by mouth daily for 4 days, Starting Mon 6/17/2024, Until Fri 6/21/2024, Normal      promethazine-dextromethorphan (PHENERGAN-DM) 6.25-15 mg/5 mL oral syrup Take 5 mL by mouth 4 (four) times a day as needed for cough, Starting Mon 6/17/2024, Normal           CONTINUE these medications which have NOT CHANGED    Details   acetaminophen (TYLENOL) 325 mg tablet Take 650 mg by mouth every 6 (six) hours as needed for mild pain, Historical Med      cetirizine (ZyrTEC) 10 mg tablet Take 1 tablet (10 mg total) by mouth daily, Starting Thu 1/10/2019, Normal      fluticasone (FLONASE) 50 mcg/act nasal spray 2 sprays into each nostril daily, Starting Thu 1/10/2019, Normal             No discharge procedures on file.    PDMP Review       None            ED Provider  Electronically Signed by             Christian Luz DO  06/17/24 2969

## 2024-06-17 NOTE — DISCHARGE INSTRUCTIONS
You are being prescribed a course of prednisone and Promethazine DM.    You likely have bronchitis from a viral illness.  You tested negative for influenza/RSV/COVID.  Your chest x-ray did not show signs of pneumonia.    You are also being provided with an inhaler as needed for wheezing.  You can take 2 puffs every 4-6 hours as needed.    Drink plenty of clear/hydrating fluids.  You can take ibuprofen 600 mg every 6 hours and Tylenol 1000 mg every 6 hours as needed for headache/pain.    Return to the emergency department for any concerning signs or symptoms.

## 2024-08-26 ENCOUNTER — OFFICE VISIT (OUTPATIENT)
Dept: URGENT CARE | Facility: CLINIC | Age: 64
End: 2024-08-26
Payer: COMMERCIAL

## 2024-08-26 VITALS
SYSTOLIC BLOOD PRESSURE: 118 MMHG | DIASTOLIC BLOOD PRESSURE: 64 MMHG | BODY MASS INDEX: 33.04 KG/M2 | RESPIRATION RATE: 17 BRPM | TEMPERATURE: 98.4 F | HEIGHT: 61 IN | OXYGEN SATURATION: 98 % | WEIGHT: 175 LBS | HEART RATE: 97 BPM

## 2024-08-26 DIAGNOSIS — N30.00 ACUTE CYSTITIS WITHOUT HEMATURIA: Primary | ICD-10-CM

## 2024-08-26 LAB
SL AMB  POCT GLUCOSE, UA: NEGATIVE
SL AMB LEUKOCYTE ESTERASE,UA: ABNORMAL
SL AMB POCT BILIRUBIN,UA: NEGATIVE
SL AMB POCT BLOOD,UA: NEGATIVE
SL AMB POCT CLARITY,UA: CLEAR
SL AMB POCT COLOR,UA: YELLOW
SL AMB POCT KETONES,UA: NEGATIVE
SL AMB POCT NITRITE,UA: POSITIVE
SL AMB POCT PH,UA: 5
SL AMB POCT SPECIFIC GRAVITY,UA: 1.01
SL AMB POCT URINE PROTEIN: NEGATIVE
SL AMB POCT UROBILINOGEN: NEGATIVE

## 2024-08-26 PROCEDURE — 87077 CULTURE AEROBIC IDENTIFY: CPT

## 2024-08-26 PROCEDURE — 87186 SC STD MICRODIL/AGAR DIL: CPT

## 2024-08-26 PROCEDURE — S9088 SERVICES PROVIDED IN URGENT: HCPCS

## 2024-08-26 PROCEDURE — 99213 OFFICE O/P EST LOW 20 MIN: CPT

## 2024-08-26 PROCEDURE — 81002 URINALYSIS NONAUTO W/O SCOPE: CPT

## 2024-08-26 PROCEDURE — 87086 URINE CULTURE/COLONY COUNT: CPT

## 2024-08-26 RX ORDER — NITROFURANTOIN 25; 75 MG/1; MG/1
100 CAPSULE ORAL 2 TIMES DAILY
Qty: 14 CAPSULE | Refills: 0 | Status: SHIPPED | OUTPATIENT
Start: 2024-08-26 | End: 2024-09-02

## 2024-08-26 NOTE — PROGRESS NOTES
[facility]        NAME: Brooklyn Vazquez is a 63 y.o. female  : 1960    MRN: [id number]  DATE: 2024  TIME: 12:56 PM    Assessment and Plan   Acute cystitis without hematuria [N30.00]  1. Acute cystitis without hematuria  POCT urine dip    Urine culture    Urine culture    Urine culture    nitrofurantoin (MACROBID) 100 mg capsule    CANCELED: Urine culture        Take antibiotic as prescribed.  Complete full dose of antibiotics even if symptoms begin to improve or resolve.  Proper hygiene.  Be sure to wipe from front to back.  Do not wear restrictive clothing.  Avoid scented bubble baths.  May use OTC Tylenol for fever.  DRINK LOTS OF FLUIDS.  Observe for signs of worsening infection including increased pain, discharge, blood in the urine, back or flank pain, fever or chills, or persistent symptoms.  Your symptoms should begin to improve over the next couple days.    Patient Instructions       Follow up with PCP in 3-5 days.  Proceed to  ER if symptoms worsen.    If tests are performed, our office will contact you with results only if changes need to made to the care plan discussed with you at the visit. [facility]    Chief Complaint     Chief Complaint   Patient presents with   • Possible UTI     Started 3 days ago  Low back pain, and dysuria  Recently had UTI 24  OTC AZO, last dose 1 day ago         History of Present Illness       Started 3 days ago   Low back pain, and dysuria   Recently had UTI 2024- treated with keflex.  OTC AZO, last dose 1 day ago   Denies any fevers, chills, nausea.        Review of Systems   Review of Systems   Constitutional:  Negative for appetite change, chills, fatigue and fever.   Respiratory:  Negative for cough, shortness of breath, wheezing and stridor.    Cardiovascular:  Negative for chest pain and palpitations.   Gastrointestinal:  Negative for abdominal pain, constipation, diarrhea, nausea and  "vomiting.   Genitourinary:  Positive for dysuria and urgency. Negative for flank pain, hematuria and pelvic pain.   Musculoskeletal:  Positive for back pain.         Current Medications       Current Outpatient Medications:   •  nitrofurantoin (MACROBID) 100 mg capsule, Take 1 capsule (100 mg total) by mouth 2 (two) times a day for 7 days, Disp: 14 capsule, Rfl: 0  •  acetaminophen (TYLENOL) 325 mg tablet, Take 650 mg by mouth every 6 (six) hours as needed for mild pain (Patient not taking: Reported on 8/24/2023), Disp: , Rfl:   •  cetirizine (ZyrTEC) 10 mg tablet, Take 1 tablet (10 mg total) by mouth daily (Patient not taking: Reported on 8/24/2023), Disp: 30 tablet, Rfl: 0  •  fluticasone (FLONASE) 50 mcg/act nasal spray, 2 sprays into each nostril daily (Patient not taking: Reported on 8/24/2023), Disp: 16 g, Rfl: 0  •  promethazine-dextromethorphan (PHENERGAN-DM) 6.25-15 mg/5 mL oral syrup, Take 5 mL by mouth 4 (four) times a day as needed for cough (Patient not taking: Reported on 8/26/2024), Disp: 118 mL, Rfl: 0    Current Allergies     Allergies as of 08/26/2024 - Reviewed 08/26/2024   Allergen Reaction Noted   • Demerol [meperidine] Vomiting 01/10/2019   • Oxycodone Vomiting 04/09/2021            The following portions of the patient's history were reviewed and updated as appropriate: allergies, current medications, past family history, past medical history, past social history, past surgical history and problem list.     Past Medical History:   Diagnosis Date   • GERD (gastroesophageal reflux disease)    • Known health problems: none    • PONV (postoperative nausea and vomiting)        Past Surgical History:   Procedure Laterality Date   • APPENDECTOMY     • HYSTERECTOMY     • MENISCECTOMY         No family history on file.      Medications have been verified.        Objective   /64   Pulse 97   Temp 98.4 °F (36.9 °C)   Resp 17   Ht 5' 0.5\" (1.537 m)   Wt 79.4 kg (175 lb)   SpO2 98%   BMI 33.61 " kg/m²        Physical Exam     Physical Exam  Vitals and nursing note reviewed.   Constitutional:       General: She is not in acute distress.     Appearance: Normal appearance. She is normal weight. She is not ill-appearing, toxic-appearing or diaphoretic.   Cardiovascular:      Rate and Rhythm: Normal rate and regular rhythm.      Pulses: Normal pulses.      Heart sounds: Normal heart sounds. No murmur heard.     No friction rub. No gallop.   Pulmonary:      Effort: Pulmonary effort is normal. No respiratory distress.      Breath sounds: Normal breath sounds. No stridor. No wheezing, rhonchi or rales.   Chest:      Chest wall: No tenderness.   Abdominal:      General: Abdomen is flat. Bowel sounds are normal. There is no distension.      Palpations: Abdomen is soft. There is no mass.      Tenderness: There is no abdominal tenderness. There is no right CVA tenderness, left CVA tenderness, guarding or rebound.      Hernia: No hernia is present.   Neurological:      Mental Status: She is alert.

## 2024-08-26 NOTE — PATIENT INSTRUCTIONS
Take antibiotic as prescribed.  Complete full dose of antibiotics even if symptoms begin to improve or resolve.  Proper hygiene.  Be sure to wipe from front to back.  Do not wear restrictive clothing.  Avoid scented bubble baths.  May use OTC Tylenol for fever.  DRINK LOTS OF FLUIDS.  Observe for signs of worsening infection including increased pain, discharge, blood in the urine, back or flank pain, fever or chills, or persistent symptoms.  Your symptoms should begin to improve over the next couple days.

## 2024-08-28 ENCOUNTER — TELEPHONE (OUTPATIENT)
Dept: URGENT CARE | Facility: CLINIC | Age: 64
End: 2024-08-28

## 2024-08-28 DIAGNOSIS — N39.0 URINARY TRACT INFECTION WITHOUT HEMATURIA, SITE UNSPECIFIED: Primary | ICD-10-CM

## 2024-08-28 LAB — BACTERIA UR CULT: ABNORMAL

## 2024-08-28 NOTE — TELEPHONE ENCOUNTER
Called patient about urine culture results.  She is currently on Macrobid and stated that she only has slight relief.  Since it is showing intermediate on culture, advised we could discontinue and start Augmentin which shows susceptibility.  Patient verbalized understanding and agreed with plan.  Confirmed pharmacy.  Patient did not have any other questions or concerns at this time.

## 2024-10-04 ENCOUNTER — OFFICE VISIT (OUTPATIENT)
Dept: URGENT CARE | Facility: CLINIC | Age: 64
End: 2024-10-04
Payer: COMMERCIAL

## 2024-10-04 VITALS
TEMPERATURE: 98 F | WEIGHT: 175 LBS | BODY MASS INDEX: 33.04 KG/M2 | RESPIRATION RATE: 17 BRPM | SYSTOLIC BLOOD PRESSURE: 130 MMHG | HEIGHT: 61 IN | DIASTOLIC BLOOD PRESSURE: 72 MMHG | HEART RATE: 87 BPM | OXYGEN SATURATION: 98 %

## 2024-10-04 DIAGNOSIS — L24.9 IRRITANT CONTACT DERMATITIS, UNSPECIFIED TRIGGER: Primary | ICD-10-CM

## 2024-10-04 PROCEDURE — S9088 SERVICES PROVIDED IN URGENT: HCPCS | Performed by: EMERGENCY MEDICINE

## 2024-10-04 PROCEDURE — 99213 OFFICE O/P EST LOW 20 MIN: CPT | Performed by: EMERGENCY MEDICINE

## 2024-10-04 RX ORDER — METHYLPREDNISOLONE 4 MG
TABLET, DOSE PACK ORAL
Qty: 21 EACH | Refills: 0 | Status: SHIPPED | OUTPATIENT
Start: 2024-10-04

## 2024-10-04 RX ORDER — TRIAMCINOLONE ACETONIDE 1 MG/G
CREAM TOPICAL 2 TIMES DAILY
Qty: 45 G | Refills: 0 | Status: SHIPPED | OUTPATIENT
Start: 2024-10-04

## 2024-10-04 NOTE — PROGRESS NOTES
[facility]        NAME: Brooklyn Vazquez is a 64 y.o. female  : 1960    MRN: [id number]  DATE: 2024  TIME: 3:01 PM    Assessment and Plan   Irritant contact dermatitis, unspecified trigger [L24.9]  1. Irritant contact dermatitis, unspecified trigger  methylPREDNISolone 4 MG tablet therapy pack    triamcinolone (KENALOG) 0.1 % cream            Patient Instructions     Take steroid as prescribed  Use cream as needed  Follow up with PCP in 3-5 days.  Proceed to  ER if symptoms worsen.    If tests are performed, our office will contact you with results only if changes need to made to the care plan discussed with you at the visit. [facility]    Chief Complaint     Chief Complaint   Patient presents with    Rash     Started 3 days ago  Left sided rash, and left arm rash  OTC topical cream           History of Present Illness       Rash  This is a new problem. Episode onset: 3 days. Location: L side and L arm. The rash is characterized by itchiness. Associated with: carrying her chicken and old straw. Associated symptoms include itching (slight). Pertinent negatives include no fatigue, fever or shortness of breath. Treatments tried: Benadryl cream, apple cider vinegar.       Review of Systems   Review of Systems   Constitutional:  Negative for chills, diaphoresis, fatigue and fever.   HENT: Negative.  Negative for facial swelling and trouble swallowing.    Eyes: Negative.  Negative for visual disturbance.   Respiratory: Negative.  Negative for shortness of breath.    Cardiovascular: Negative.    Skin:  Positive for itching (slight) and rash.   Neurological: Negative.          Current Medications       Current Outpatient Medications:     methylPREDNISolone 4 MG tablet therapy pack, Use as directed on package, Disp: 21 each, Rfl: 0    triamcinolone (KENALOG) 0.1 % cream, Apply topically 2 (two) times a day, Disp: 45 g, Rfl: 0    acetaminophen  "(TYLENOL) 325 mg tablet, Take 650 mg by mouth every 6 (six) hours as needed for mild pain (Patient not taking: Reported on 8/24/2023), Disp: , Rfl:     cetirizine (ZyrTEC) 10 mg tablet, Take 1 tablet (10 mg total) by mouth daily (Patient not taking: Reported on 8/24/2023), Disp: 30 tablet, Rfl: 0    fluticasone (FLONASE) 50 mcg/act nasal spray, 2 sprays into each nostril daily (Patient not taking: Reported on 8/24/2023), Disp: 16 g, Rfl: 0    promethazine-dextromethorphan (PHENERGAN-DM) 6.25-15 mg/5 mL oral syrup, Take 5 mL by mouth 4 (four) times a day as needed for cough (Patient not taking: Reported on 8/26/2024), Disp: 118 mL, Rfl: 0    Current Allergies     Allergies as of 10/04/2024 - Reviewed 10/04/2024   Allergen Reaction Noted    Demerol [meperidine] Vomiting 01/10/2019    Oxycodone Vomiting 04/09/2021            The following portions of the patient's history were reviewed and updated as appropriate: allergies, current medications, past family history, past medical history, past social history, past surgical history and problem list.     Past Medical History:   Diagnosis Date    GERD (gastroesophageal reflux disease)     Known health problems: none     PONV (postoperative nausea and vomiting)        Past Surgical History:   Procedure Laterality Date    APPENDECTOMY      HYSTERECTOMY      MENISCECTOMY         No family history on file.      Medications have been verified.        Objective   /72   Pulse 87   Temp 98 °F (36.7 °C)   Resp 17   Ht 5' 0.5\" (1.537 m)   Wt 79.4 kg (175 lb)   SpO2 98%   BMI 33.61 kg/m²        Physical Exam     Physical Exam  Constitutional:       Appearance: Normal appearance.   HENT:      Nose: Nose normal.      Mouth/Throat:      Mouth: Mucous membranes are moist.      Pharynx: Oropharynx is clear.   Eyes:      Extraocular Movements: Extraocular movements intact.      Pupils: Pupils are equal, round, and reactive to light.   Cardiovascular:      Rate and Rhythm: " Normal rate and regular rhythm.      Pulses: Normal pulses.      Heart sounds: Normal heart sounds.   Pulmonary:      Effort: Pulmonary effort is normal.      Breath sounds: Normal breath sounds.   Musculoskeletal:         General: Normal range of motion.   Skin:     General: Skin is warm and dry.      Findings: Rash (L side of abdomen and L arm) present.   Neurological:      General: No focal deficit present.      Mental Status: She is alert and oriented to person, place, and time. Mental status is at baseline.

## 2024-10-04 NOTE — PATIENT INSTRUCTIONS
Take steroid as prescribed  Use cream as needed  Follow up with PCP in 3-5 days.  Proceed to  ER if symptoms worsen.    If tests are performed, our office will contact you with results only if changes need to made to the care plan discussed with you at the visit. You can review your full results on [facility].

## 2025-03-03 ENCOUNTER — HOSPITAL ENCOUNTER (EMERGENCY)
Facility: HOSPITAL | Age: 65
Discharge: LEFT BEFORE DISCHARGE | End: 2025-03-03
Attending: STUDENT IN AN ORGANIZED HEALTH CARE EDUCATION/TRAINING PROGRAM
Payer: COMMERCIAL

## 2025-03-03 VITALS
TEMPERATURE: 98.7 F | SYSTOLIC BLOOD PRESSURE: 122 MMHG | WEIGHT: 180.6 LBS | DIASTOLIC BLOOD PRESSURE: 78 MMHG | HEIGHT: 60 IN | HEART RATE: 81 BPM | OXYGEN SATURATION: 97 % | BODY MASS INDEX: 35.46 KG/M2 | RESPIRATION RATE: 18 BRPM

## 2025-03-03 DIAGNOSIS — S99.922A INJURY OF LEFT GREAT TOE, INITIAL ENCOUNTER: Primary | ICD-10-CM

## 2025-03-03 PROCEDURE — 99283 EMERGENCY DEPT VISIT LOW MDM: CPT

## 2025-03-03 PROCEDURE — 99284 EMERGENCY DEPT VISIT MOD MDM: CPT | Performed by: STUDENT IN AN ORGANIZED HEALTH CARE EDUCATION/TRAINING PROGRAM

## 2025-03-03 RX ORDER — ACETAMINOPHEN 325 MG/1
975 TABLET ORAL ONCE
Status: COMPLETED | OUTPATIENT
Start: 2025-03-03 | End: 2025-03-03

## 2025-03-03 RX ORDER — NAPROXEN 250 MG/1
500 TABLET ORAL ONCE
Status: COMPLETED | OUTPATIENT
Start: 2025-03-03 | End: 2025-03-03

## 2025-03-03 RX ADMIN — ACETAMINOPHEN 325MG 975 MG: 325 TABLET ORAL at 19:52

## 2025-03-03 RX ADMIN — NAPROXEN 500 MG: 250 TABLET ORAL at 19:52

## 2025-03-04 NOTE — ED NOTES
Patient updated on plan of care, multiple trauma evals in ED, will be a delay in her xray     Jackeline Stinson, [place]  03/03/25 2047

## 2025-03-04 NOTE — ED PROVIDER NOTES
Time reflects when diagnosis was documented in both MDM as applicable and the Disposition within this note       Time User Action Codes Description Comment    3/4/2025  7:44 AM Keithchitra Christian Add [S99.922A] Injury of left great toe, initial encounter           ED Disposition       ED Disposition   Left from Room after Provider Exam    Condition   --    Date/Time   Mon Mar 3, 2025  9:07 PM    Comment   --             Assessment & Plan       Medical Decision Making  This patient presents with left great toe injury.   Diagnostic considerations include phalanx fracture, subungual hematoma, interphalangeal dislocation, contusion.    Vital signs reviewed. No signs of subungual hematoma. There is diffuse tenderness and ecchymosis along the great toe (volar/dorsal/lateral aspects). Naproxen/Tylenol administered. The patient left the ED prior to XR imaging being obtained.            Problems Addressed:  Injury of left great toe, initial encounter: acute illness or injury    Risk  OTC drugs.  Prescription drug management.             Medications   naproxen (NAPROSYN) tablet 500 mg (500 mg Oral Given 3/3/25 1952)   acetaminophen (TYLENOL) tablet 975 mg (975 mg Oral Given 3/3/25 1952)       ED Risk Strat Scores                                                History of Present Illness       Chief Complaint   Patient presents with    Foot Injury     Patient states that she dropped a chair on her left foot/toe about 1130 today. Pt ambulated to triage box. Patient is complaining of left great toe pain.       Past Medical History:   Diagnosis Date    GERD (gastroesophageal reflux disease)     Known health problems: none     PONV (postoperative nausea and vomiting)       Past Surgical History:   Procedure Laterality Date    APPENDECTOMY      HYSTERECTOMY      MENISCECTOMY        History reviewed. No pertinent family history.   Social History     Tobacco Use    Smoking status: Never     Passive exposure: Never    Smokeless tobacco:  Never   Vaping Use    Vaping status: Never Used   Substance Use Topics    Alcohol use: Not Currently     Comment: occasional    Drug use: No      E-Cigarette/Vaping    E-Cigarette Use Never User       E-Cigarette/Vaping Substances    Nicotine No     THC No     CBD No     Flavoring No       I have reviewed and agree with the history as documented.     63 yo F. Patient presents s/p left great toe injury. States that she dropped a wooden chair along the left great toe earlier in the day. Denies all other injuries. Took a dose of Tylenol earlier in the day with minimal improvement.       History provided by:  Patient  Injury  Location:  Left great toe  Severity:  Moderate  Onset quality:  Gradual  Duration:  8 hours  Timing:  Constant  Progression:  Unchanged  Chronicity:  New  Associated symptoms: no rash      Review of Systems   Musculoskeletal:  Positive for arthralgias, gait problem and joint swelling.   Skin:  Positive for color change and wound. Negative for pallor and rash.   All other systems reviewed and are negative.    Objective       ED Triage Vitals [03/03/25 1912]   Temperature Pulse Blood Pressure Respirations SpO2 Patient Position - Orthostatic VS   98.7 °F (37.1 °C) 81 122/78 18 97 % Sitting      Temp Source Heart Rate Source BP Location FiO2 (%) Pain Score    Temporal Monitor Left arm -- 7      Vitals      Date and Time Temp Pulse SpO2 Resp BP Pain Score FACES Pain Rating User   03/03/25 1952 -- -- -- -- -- 4 -- KK   03/03/25 1912 98.7 °F (37.1 °C) 81 97 % 18 122/78 7 -- SB          Physical Exam  Vitals and nursing note reviewed.   Constitutional:       General: She is not in acute distress.     Appearance: She is not ill-appearing or toxic-appearing.   HENT:      Head: Normocephalic and atraumatic.      Right Ear: External ear normal.      Left Ear: External ear normal.      Nose: No congestion or rhinorrhea.   Eyes:      General: No scleral icterus.        Right eye: No discharge.         Left eye:  No discharge.      Extraocular Movements: Extraocular movements intact.      Conjunctiva/sclera: Conjunctivae normal.   Cardiovascular:      Rate and Rhythm: Normal rate and regular rhythm.      Pulses: Normal pulses.      Heart sounds: Normal heart sounds. No murmur heard.  Pulmonary:      Effort: Pulmonary effort is normal. No respiratory distress.      Breath sounds: Normal breath sounds. No stridor. No wheezing, rhonchi or rales.   Chest:      Chest wall: No tenderness.   Abdominal:      Tenderness: There is no abdominal tenderness.   Musculoskeletal:         General: Tenderness and signs of injury present.      Right lower leg: No edema.      Left lower leg: No edema.      Comments: There is tenderness to palpation along the dorsal/volar/lateral aspects of the left great toe. No subungual hematoma. Decreased ROM due to pain. Mild swelling noted. No signs of closed deformity.    Skin:     General: Skin is warm and dry.      Coloration: Skin is not jaundiced or pale.      Findings: Bruising present.   Neurological:      General: No focal deficit present.      Mental Status: She is alert and oriented to person, place, and time.   Psychiatric:         Mood and Affect: Mood normal.         Behavior: Behavior normal.         Results Reviewed       None            No orders to display       Procedures    ED Medication and Procedure Management   Prior to Admission Medications   Prescriptions Last Dose Informant Patient Reported? Taking?   acetaminophen (TYLENOL) 325 mg tablet   Yes No   Sig: Take 650 mg by mouth every 6 (six) hours as needed for mild pain   Patient not taking: Reported on 2023   cetirizine (ZyrTEC) 10 mg tablet   No No   Sig: Take 1 tablet (10 mg total) by mouth daily   Patient not taking: Reported on 2023   fluticasone (FLONASE) 50 mcg/act nasal spray   No No   Si sprays into each nostril daily   Patient not taking: Reported on 2023   methylPREDNISolone 4 MG tablet therapy pack   No  No   Sig: Use as directed on package   promethazine-dextromethorphan (PHENERGAN-DM) 6.25-15 mg/5 mL oral syrup   No No   Sig: Take 5 mL by mouth 4 (four) times a day as needed for cough   Patient not taking: Reported on 8/26/2024   triamcinolone (KENALOG) 0.1 % cream   No No   Sig: Apply topically 2 (two) times a day      Facility-Administered Medications: None     Discharge Medication List as of 3/3/2025  9:08 PM        CONTINUE these medications which have NOT CHANGED    Details   acetaminophen (TYLENOL) 325 mg tablet Take 650 mg by mouth every 6 (six) hours as needed for mild pain, Historical Med      cetirizine (ZyrTEC) 10 mg tablet Take 1 tablet (10 mg total) by mouth daily, Starting Thu 1/10/2019, Normal      fluticasone (FLONASE) 50 mcg/act nasal spray 2 sprays into each nostril daily, Starting Thu 1/10/2019, Normal      methylPREDNISolone 4 MG tablet therapy pack Use as directed on package, Normal      promethazine-dextromethorphan (PHENERGAN-DM) 6.25-15 mg/5 mL oral syrup Take 5 mL by mouth 4 (four) times a day as needed for cough, Starting Mon 6/17/2024, Normal      triamcinolone (KENALOG) 0.1 % cream Apply topically 2 (two) times a day, Starting Fri 10/4/2024, Normal           No discharge procedures on file.  ED SEPSIS DOCUMENTATION   Time reflects when diagnosis was documented in both MDM as applicable and the Disposition within this note       Time User Action Codes Description Comment    3/4/2025  7:44 AM Christian Luz Add [S99.922A] Injury of left great toe, initial encounter                  Christian Luz, DO  03/04/25 0750

## 2025-04-21 ENCOUNTER — APPOINTMENT (EMERGENCY)
Dept: RADIOLOGY | Facility: HOSPITAL | Age: 65
End: 2025-04-21
Payer: COMMERCIAL

## 2025-04-21 ENCOUNTER — OFFICE VISIT (OUTPATIENT)
Dept: LAB | Facility: HOSPITAL | Age: 65
End: 2025-04-21
Payer: COMMERCIAL

## 2025-04-21 ENCOUNTER — OFFICE VISIT (OUTPATIENT)
Dept: OBGYN CLINIC | Facility: CLINIC | Age: 65
End: 2025-04-21
Payer: COMMERCIAL

## 2025-04-21 ENCOUNTER — HOSPITAL ENCOUNTER (EMERGENCY)
Facility: HOSPITAL | Age: 65
Discharge: HOME | End: 2025-04-21
Attending: EMERGENCY MEDICINE
Payer: COMMERCIAL

## 2025-04-21 ENCOUNTER — PREP FOR PROCEDURE (OUTPATIENT)
Dept: OBGYN CLINIC | Facility: CLINIC | Age: 65
End: 2025-04-21

## 2025-04-21 VITALS
OXYGEN SATURATION: 98 % | SYSTOLIC BLOOD PRESSURE: 154 MMHG | RESPIRATION RATE: 16 BRPM | HEART RATE: 109 BPM | TEMPERATURE: 97.6 F | DIASTOLIC BLOOD PRESSURE: 71 MMHG

## 2025-04-21 VITALS — BODY MASS INDEX: 35.51 KG/M2 | WEIGHT: 180.9 LBS | HEIGHT: 60 IN

## 2025-04-21 DIAGNOSIS — S82.841A CLOSED DISPLACED BIMALLEOLAR FRACTURE OF RIGHT ANKLE, INITIAL ENCOUNTER: ICD-10-CM

## 2025-04-21 DIAGNOSIS — S82.891A CLOSED RIGHT ANKLE FRACTURE, INITIAL ENCOUNTER: Primary | ICD-10-CM

## 2025-04-21 LAB
ATRIAL RATE: 70 BPM
P AXIS: 61 DEGREES
PR INTERVAL: 154 MS
QRS AXIS: 34 DEGREES
QRSD INTERVAL: 72 MS
QT INTERVAL: 380 MS
QTC INTERVAL: 410 MS
T WAVE AXIS: 37 DEGREES
VENTRICULAR RATE: 70 BPM

## 2025-04-21 PROCEDURE — 93010 ELECTROCARDIOGRAM REPORT: CPT | Performed by: INTERNAL MEDICINE

## 2025-04-21 PROCEDURE — 99283 EMERGENCY DEPT VISIT LOW MDM: CPT

## 2025-04-21 PROCEDURE — 99204 OFFICE O/P NEW MOD 45 MIN: CPT | Performed by: ORTHOPAEDIC SURGERY

## 2025-04-21 PROCEDURE — 96374 THER/PROPH/DIAG INJ IV PUSH: CPT

## 2025-04-21 PROCEDURE — 99284 EMERGENCY DEPT VISIT MOD MDM: CPT | Performed by: EMERGENCY MEDICINE

## 2025-04-21 PROCEDURE — 73600 X-RAY EXAM OF ANKLE: CPT

## 2025-04-21 PROCEDURE — 96375 TX/PRO/DX INJ NEW DRUG ADDON: CPT

## 2025-04-21 PROCEDURE — 93005 ELECTROCARDIOGRAM TRACING: CPT

## 2025-04-21 PROCEDURE — 27825 TREAT LOWER LEG FRACTURE: CPT | Performed by: EMERGENCY MEDICINE

## 2025-04-21 PROCEDURE — 73610 X-RAY EXAM OF ANKLE: CPT

## 2025-04-21 RX ORDER — OXYCODONE HYDROCHLORIDE 5 MG/1
5 TABLET ORAL EVERY 6 HOURS PRN
Qty: 15 TABLET | Refills: 0 | Status: SHIPPED | OUTPATIENT
Start: 2025-04-21

## 2025-04-21 RX ORDER — OXYCODONE HYDROCHLORIDE 5 MG/1
5 TABLET ORAL ONCE
Refills: 0 | Status: COMPLETED | OUTPATIENT
Start: 2025-04-21 | End: 2025-04-21

## 2025-04-21 RX ORDER — MORPHINE SULFATE 4 MG/ML
4 INJECTION, SOLUTION INTRAMUSCULAR; INTRAVENOUS ONCE
Status: DISCONTINUED | OUTPATIENT
Start: 2025-04-21 | End: 2025-04-21

## 2025-04-21 RX ORDER — ACETAMINOPHEN 325 MG/1
975 TABLET ORAL ONCE
Status: CANCELLED | OUTPATIENT
Start: 2025-04-21 | End: 2025-04-21

## 2025-04-21 RX ORDER — ONDANSETRON 2 MG/ML
4 INJECTION INTRAMUSCULAR; INTRAVENOUS ONCE
Status: COMPLETED | OUTPATIENT
Start: 2025-04-21 | End: 2025-04-21

## 2025-04-21 RX ORDER — ONDANSETRON 4 MG/1
4 TABLET, ORALLY DISINTEGRATING ORAL EVERY 8 HOURS PRN
Qty: 20 TABLET | Refills: 0 | Status: SHIPPED | OUTPATIENT
Start: 2025-04-21

## 2025-04-21 RX ORDER — CHLORHEXIDINE GLUCONATE ORAL RINSE 1.2 MG/ML
15 SOLUTION DENTAL ONCE
Status: CANCELLED | OUTPATIENT
Start: 2025-04-21 | End: 2025-04-21

## 2025-04-21 RX ADMIN — MORPHINE SULFATE 2 MG: 2 INJECTION, SOLUTION INTRAMUSCULAR; INTRAVENOUS at 00:59

## 2025-04-21 RX ADMIN — ONDANSETRON 4 MG: 2 INJECTION INTRAMUSCULAR; INTRAVENOUS at 00:56

## 2025-04-21 RX ADMIN — OXYCODONE HYDROCHLORIDE 5 MG: 5 TABLET ORAL at 01:36

## 2025-04-21 NOTE — H&P (VIEW-ONLY)
Assessment & Plan  Closed displaced bimalleolar fracture of right ankle, initial encounter  The risks, benefits, options and alternatives of treatment were discussed, including but not limited to:  Infection, blood loss, DVT/PE, poor wound healing, anesthetic risks, loss of motion, nerve/blood vessel damage, persistent/recurrent symptoms.  After thorough discussion, the plan will be to proceed with open reduction internal fixation of her bimalleolar fracture scheduled for 4/22/2025.  I will see her 1 week postoperatively.  I do think she needs an EKG preoperatively as she has had a history of some palpitations but denies any chest pain or tightness.  I do not think she needs any preoperative blood work.  I would anticipate that she can be discharged postoperatively tomorrow but she does understand if she does not meet criteria for discharge she may need to remain in the hospital overnight.  Orders:    Ambulatory Referral to Orthopedic Surgery    Case request operating room: OPEN REDUCTION W/ INTERNAL FIXATION (ORIF) ANKLE; Standing    EKG 12 lead; Future        Return for 1 week postop.      _____________________________________________________  CHIEF COMPLAINT:  Chief Complaint   Patient presents with    Right Ankle - Fracture         SUBJECTIVE:  Brooklyn Vazquez is a 64 y.o. year old female who presents with a right ankle injury which occurred on 4/20/2025. She was using a step stool and lost her balance. As she fell her ankle rolled inward. She was unable to bear weight following the fall. She reported to [facility] ED for evaluation. Her ankle was reduced and xrays were obtained and she was placed in a splint. She is taking ibuprofen for pain management.       PAST MEDICAL HISTORY:  Past Medical History:   Diagnosis Date    GERD (gastroesophageal reflux disease)     Known health problems: none     PONV (postoperative nausea and vomiting)        PAST SURGICAL HISTORY:  Past Surgical History:   Procedure Laterality Date     APPENDECTOMY      HYSTERECTOMY      MENISCECTOMY         FAMILY HISTORY:  History reviewed. No pertinent family history.    SOCIAL HISTORY:  Social History     Tobacco Use    Smoking status: Never     Passive exposure: Never    Smokeless tobacco: Never   Vaping Use    Vaping status: Never Used   Substance Use Topics    Alcohol use: Not Currently     Comment: occasional    Drug use: No       MEDICATIONS:    Current Outpatient Medications:     acetaminophen (TYLENOL) 325 mg tablet, Take 650 mg by mouth every 6 (six) hours as needed for mild pain, Disp: , Rfl:     fluticasone (FLONASE) 50 mcg/act nasal spray, 2 sprays into each nostril daily, Disp: 16 g, Rfl: 0    ondansetron (ZOFRAN-ODT) 4 mg disintegrating tablet, Take 1 tablet (4 mg total) by mouth every 8 (eight) hours as needed for nausea or vomiting, Disp: 20 tablet, Rfl: 0    oxyCODONE (Roxicodone) 5 immediate release tablet, Take 1 tablet (5 mg total) by mouth every 6 (six) hours as needed for severe pain for up to 15 doses Max Daily Amount: 20 mg, Disp: 15 tablet, Rfl: 0    cetirizine (ZyrTEC) 10 mg tablet, Take 1 tablet (10 mg total) by mouth daily (Patient not taking: Reported on 4/21/2025), Disp: 30 tablet, Rfl: 0  No current facility-administered medications for this visit.    ALLERGIES:  Allergies   Allergen Reactions    Demerol [Meperidine] Vomiting    Oxycodone Vomiting       Review of systems:   Constitutional: Negative for fatigue, fever or loss of apetite.   HENT: Negative.    Respiratory: Negative for shortness of breath, dyspnea.    Cardiovascular: Negative for chest pain/tightness.   Gastrointestinal: Negative for abdominal pain, N/V.   Endocrine: Negative for cold/heat intolerance, unexplained weight loss/gain.   Genitourinary: Negative for flank pain, dysuria, hematuria.   Musculoskeletal: As noted in HPI   Skin: Negative for rash.    Neurological: Negative  Psychiatric/Behavioral: Negative for  agitation.  _____________________________________________________  PHYSICAL EXAMINATION:    Height 5' (1.524 m), weight 82.1 kg (180 lb 14.4 oz).    General: well developed and well nourished, alert, oriented times 3, and appears comfortable  Psychiatric: Normal  HEENT: Benign  Cardiovascular: Regular    Pulmonary: No wheezing or stridor  Abdomen: Soft, Nontender  Skin: No masses, erythema, lacerations, fluctation, ulcerations  Neurovascular: Sensory and motor grossly intact    MUSCULOSKELETAL EXAMINATION:    right ankle -   Patient is nonweightbearing and presents in a wheelchair.  No anatomical deformity  Skin is warm and dry to touch with no signs of erythema, ecchymosis, infection  Mild generalized soft tissue swelling or effusion noted  Calf compartments are soft and supple  2+ TP and DP pulses with brisk capillary refill to the toes  Sural, saphenous, tibial, superficial, and deep peroneal motor and sensory distributions intact  Sensation to light touch intact distally      _____________________________________________________  STUDIES REVIEWED:  X-Ray of right ankle obtained on 4/20/2025 were reviewed and demonstrate  a bimalleolar fracture of the right ankle with displacement .    The x-ray report was reviewed.    The ER note was reviewed.      Scribe Attestation      I,:  Ivis Pinedo am acting as a scribe while in the presence of the attending physician.:       I,:  Brian Au, DO personally performed the services described in this documentation    as scribed in my presence.:

## 2025-04-21 NOTE — ED PROVIDER NOTES
Time reflects when diagnosis was documented in both MDM as applicable and the Disposition within this note       Time User Action Codes Description Comment    4/21/2025  1:33 AM Henrique Presley Add [S82.891A] Closed right ankle fracture, initial encounter           ED Disposition       ED Disposition   Discharge    Condition   Stable    Date/Time   Mon Apr 21, 2025  1:32 AM    Comment   Brooklyn Vazquez discharge to home/self care.                   Assessment & Plan       Medical Decision Making  Patient presented to the emergency department and a MSE was performed. The patient was evaluated for complaint related to acute right ankle pain.  Patient is potentially at risk for, but not limited to, strain, sprain, fracture, dislocation or ligamentous disruption.  Several of these diagnoses have been evaluated and ruled out by history and physical.  As needed, patient will be further evaluated with laboratory and imaging studies.  Higher level diagnostics, such as CT imaging or ultrasound, may also be required.  Please see work-up portion of the note for further evaluation of patient's risk.  Socioeconomic factors were also considered as part of the decision-making process.  Unless otherwise stated in the chart or patient is admitted as elsewhere documented, any previously prescribed medications will be maintained.      Problems Addressed:  Closed right ankle fracture, initial encounter: acute illness or injury    Amount and/or Complexity of Data Reviewed  Radiology: ordered and independent interpretation performed. Decision-making details documented in ED Course.    Risk  Prescription drug management.        ED Course as of 04/21/25 0201   Mon Apr 21, 2025   0101 Patient with right ankle fracture.  Patient will be placed in a splint.  Patient will referred to orthopedics.   0201 Better alignment status post reduction and splinting.  Stable for discharge.  Follow-up with orthopedics as an outpatient.       Medications    morphine injection 2 mg (2 mg Intravenous Given 4/21/25 0059)   ondansetron (ZOFRAN) injection 4 mg (4 mg Intravenous Given 4/21/25 0056)   oxyCODONE (ROXICODONE) IR tablet 5 mg (5 mg Oral Given 4/21/25 0136)       ED Risk Strat Scores                    No data recorded        SBIRT 22yo+      Flowsheet Row Most Recent Value   Initial Alcohol Screen: US AUDIT-C     1. How often do you have a drink containing alcohol? 0 Filed at: 04/21/2025 0040   2. How many drinks containing alcohol do you have on a typical day you are drinking?  0 Filed at: 04/21/2025 0040   3a. Male UNDER 65: How often do you have five or more drinks on one occasion? 0 Filed at: 04/21/2025 0040   3b. FEMALE Any Age, or MALE 65+: How often do you have 4 or more drinks on one occassion? 0 Filed at: 04/21/2025 0040   Audit-C Score 0 Filed at: 04/21/2025 0040   ELENO: How many times in the past year have you...    Used an illegal drug or used a prescription medication for non-medical reasons? Never Filed at: 04/21/2025 0040                            History of Present Illness       Chief Complaint   Patient presents with    Ankle Pain     Pt fell off step stool and twisted right ankle. + head strike - loc -thinners        Past Medical History:   Diagnosis Date    GERD (gastroesophageal reflux disease)     Known health problems: none     PONV (postoperative nausea and vomiting)       Past Surgical History:   Procedure Laterality Date    APPENDECTOMY      HYSTERECTOMY      MENISCECTOMY        History reviewed. No pertinent family history.   Social History     Tobacco Use    Smoking status: Never     Passive exposure: Never    Smokeless tobacco: Never   Vaping Use    Vaping status: Never Used   Substance Use Topics    Alcohol use: Not Currently     Comment: occasional    Drug use: No      E-Cigarette/Vaping    E-Cigarette Use Never User       E-Cigarette/Vaping Substances    Nicotine No     THC No     CBD No     Flavoring No       I have reviewed and  agree with the history as documented.     64-year-old female to the emergency room for evaluation of severe right ankle pain.  Patient slipped and fell while reaching for an object.  Twisted right ankle.  Now with deformity and inability to ambulate.  Denies other injury.      History provided by:  Patient  Ankle Pain  Location:  Ankle  Injury: yes    Mechanism of injury: fall    Ankle location:  R ankle      Review of Systems   Respiratory:  Negative for shortness of breath.    Cardiovascular:  Negative for chest pain.   Musculoskeletal:  Positive for arthralgias, gait problem and joint swelling.   Neurological:  Negative for syncope and light-headedness.           Objective       ED Triage Vitals [04/21/25 0041]   Temperature Pulse Blood Pressure Respirations SpO2 Patient Position - Orthostatic VS   97.6 °F (36.4 °C) (!) 109 154/71 16 98 % Sitting      Temp Source Heart Rate Source BP Location FiO2 (%) Pain Score    Temporal Monitor Left arm -- 8      Vitals      Date and Time Temp Pulse SpO2 Resp BP Pain Score FACES Pain Rating User   04/21/25 0136 -- -- -- -- -- 10 - Worst Possible Pain --    04/21/25 0059 -- -- -- -- -- 10 - Worst Possible Pain --    04/21/25 0041 97.6 °F (36.4 °C) 109 98 % 16 154/71 8 -- AD            Physical Exam  Vitals and nursing note reviewed.   Constitutional:       General: She is in acute distress.      Appearance: She is normal weight. She is not ill-appearing or toxic-appearing.   HENT:      Head: Normocephalic and atraumatic.      Right Ear: External ear normal.      Left Ear: External ear normal.      Nose: Nose normal.      Mouth/Throat:      Mouth: Mucous membranes are moist.   Pulmonary:      Effort: Pulmonary effort is normal. No respiratory distress.   Musculoskeletal:         General: Swelling, tenderness, deformity and signs of injury present.      Right lower leg: No edema.      Left lower leg: No edema.      Right ankle: Swelling and deformity present. Tenderness  "present over the lateral malleolus. Decreased range of motion.      Right foot: Normal.      Comments: Distal neurovascular intact.   Skin:     Coloration: Skin is not pale.   Neurological:      Mental Status: She is alert.   Psychiatric:         Mood and Affect: Mood is anxious.         Results Reviewed       None            XR ankle 2 views RIGHT   ED Interpretation by Henrique Presley DO (04/21 0201)   Better alignment.  Stable for discharge.      XR ankle 3+ views RIGHT   ED Interpretation by Henrique Presley DO (04/21 0101)   Commune displaced fracture of the distal tibia and fibula          Orthopedic injury treatment    Date/Time: 4/21/2025 1:36 AM    Performed by: Henrique Presley DO  Authorized by: Henrique Presley DO    Patient Location:  ED  [place] Protocol:  Procedure performed by: (Yuliana KU and Jason)  Consent: The procedure was performed in an emergent situation. Verbal consent obtained.  Risks and benefits: risks, benefits and alternatives were discussed  Consent given by: patient  Time out: Immediately prior to procedure a \"time out\" was called to verify the correct patient, procedure, equipment, support staff and site/side marked as required.  Timeout called at: 4/21/2025 1:30 AM.  Patient understanding: patient states understanding of the procedure being performed  Patient identity confirmed: verbally with patient    Injury location:  Ankle  Location details:  Right ankle  Injury type:  Fracture-dislocation  Neurovascular status: Neurovascularly intact    Distal perfusion: normal    Neurological function: normal    Range of motion: reduced    Local anesthesia used?: No    Manipulation performed?: Yes    Skeletal traction used?: Yes    Immobilization:  Splint  Splint type:  Sugar tong (Static splint)  Supplies used:  Cotton padding, elastic bandage and Ortho-Glass  Neurovascular status: Neurovascularly intact    Distal perfusion: normal    Neurological function: normal    Range of " motion: unchanged    Patient tolerance:  Patient tolerated the procedure well with no immediate complications      ED Medication and Procedure Management   Prior to Admission Medications   Prescriptions Last Dose Informant Patient Reported? Taking?   acetaminophen (TYLENOL) 325 mg tablet   Yes No   Sig: Take 650 mg by mouth every 6 (six) hours as needed for mild pain   Patient not taking: Reported on 2023   cetirizine (ZyrTEC) 10 mg tablet   No No   Sig: Take 1 tablet (10 mg total) by mouth daily   Patient not taking: Reported on 2023   fluticasone (FLONASE) 50 mcg/act nasal spray   No No   Si sprays into each nostril daily   Patient not taking: Reported on 2023      Facility-Administered Medications: None     Patient's Medications   Discharge Prescriptions    ONDANSETRON (ZOFRAN-ODT) 4 MG DISINTEGRATING TABLET    Take 1 tablet (4 mg total) by mouth every 8 (eight) hours as needed for nausea or vomiting       Start Date: 2025 End Date: --       Order Dose: 4 mg       Quantity: 20 tablet    Refills: 0    OXYCODONE (ROXICODONE) 5 IMMEDIATE RELEASE TABLET    Take 1 tablet (5 mg total) by mouth every 6 (six) hours as needed for severe pain for up to 15 doses Max Daily Amount: 20 mg       Start Date: 2025 End Date: --       Order Dose: 5 mg       Quantity: 15 tablet    Refills: 0       ED SEPSIS DOCUMENTATION   Time reflects when diagnosis was documented in both MDM as applicable and the Disposition within this note       Time User Action Codes Description Comment    2025  1:33 AM Henrique Presley Add [S82.891A] Closed right ankle fracture, initial encounter                  Henrique Presley,   25 0201

## 2025-04-21 NOTE — DISCHARGE INSTRUCTIONS
Use pain medications as directed.  We recommend ice 4-6 times a day for 15 to 20 minutes at a time to the affected area.  Try and leave the area elevated to help reduce swelling and pain.  Return with any worsening, particularly increased pain, severe swelling, or any other symptomatology that seems concerning.    You must follow-up with orthopedics.  Surgery will be required to repair this fracture.    Please be aware that compartment syndrome is a phenomenon that can occur from fracture.  You will have severe pain.  Return immediately if this occurs.    Your imaging studies have been preliminarily reviewed by the emergency department.  Further review by Radiology is pending at this time.  If there is a discrepancy or a finding of additional concern identified, we will attempt to contact you at the number you have provided us.  If you do not hear from us, follow-up with your primary care provider within 1-2 weeks is always recommended to ensure that all findings were normal or as initially reported.  Your results may also be available on [email or web address] [email or web address]

## 2025-04-21 NOTE — ASSESSMENT & PLAN NOTE
The risks, benefits, options and alternatives of treatment were discussed, including but not limited to:  Infection, blood loss, DVT/PE, poor wound healing, anesthetic risks, loss of motion, nerve/blood vessel damage, persistent/recurrent symptoms.  After thorough discussion, the plan will be to proceed with open reduction internal fixation of her bimalleolar fracture scheduled for 4/22/2025.  I will see her 1 week postoperatively.  I do think she needs an EKG preoperatively as she has had a history of some palpitations but denies any chest pain or tightness.  I do not think she needs any preoperative blood work.  I would anticipate that she can be discharged postoperatively tomorrow but she does understand if she does not meet criteria for discharge she may need to remain in the hospital overnight.  Orders:    Ambulatory Referral to Orthopedic Surgery    Case request operating room: OPEN REDUCTION W/ INTERNAL FIXATION (ORIF) ANKLE; Standing    EKG 12 lead; Future

## 2025-04-21 NOTE — PROGRESS NOTES
Assessment & Plan  Closed displaced bimalleolar fracture of right ankle, initial encounter  The risks, benefits, options and alternatives of treatment were discussed, including but not limited to:  Infection, blood loss, DVT/PE, poor wound healing, anesthetic risks, loss of motion, nerve/blood vessel damage, persistent/recurrent symptoms.  After thorough discussion, the plan will be to proceed with open reduction internal fixation of her bimalleolar fracture scheduled for 4/22/2025.  I will see her 1 week postoperatively.  I do think she needs an EKG preoperatively as she has had a history of some palpitations but denies any chest pain or tightness.  I do not think she needs any preoperative blood work.  I would anticipate that she can be discharged postoperatively tomorrow but she does understand if she does not meet criteria for discharge she may need to remain in the hospital overnight.  Orders:    Ambulatory Referral to Orthopedic Surgery    Case request operating room: OPEN REDUCTION W/ INTERNAL FIXATION (ORIF) ANKLE; Standing    EKG 12 lead; Future        Return for 1 week postop.      _____________________________________________________  CHIEF COMPLAINT:  Chief Complaint   Patient presents with    Right Ankle - Fracture         SUBJECTIVE:  Brooklyn Vazquez is a 64 y.o. year old female who presents with a right ankle injury which occurred on 4/20/2025. She was using a step stool and lost her balance. As she fell her ankle rolled inward. She was unable to bear weight following the fall. She reported to [facility] ED for evaluation. Her ankle was reduced and xrays were obtained and she was placed in a splint. She is taking ibuprofen for pain management.       PAST MEDICAL HISTORY:  Past Medical History:   Diagnosis Date    GERD (gastroesophageal reflux disease)     Known health problems: none     PONV (postoperative nausea and vomiting)        PAST SURGICAL HISTORY:  Past Surgical History:   Procedure Laterality Date     APPENDECTOMY      HYSTERECTOMY      MENISCECTOMY         FAMILY HISTORY:  History reviewed. No pertinent family history.    SOCIAL HISTORY:  Social History     Tobacco Use    Smoking status: Never     Passive exposure: Never    Smokeless tobacco: Never   Vaping Use    Vaping status: Never Used   Substance Use Topics    Alcohol use: Not Currently     Comment: occasional    Drug use: No       MEDICATIONS:    Current Outpatient Medications:     acetaminophen (TYLENOL) 325 mg tablet, Take 650 mg by mouth every 6 (six) hours as needed for mild pain, Disp: , Rfl:     fluticasone (FLONASE) 50 mcg/act nasal spray, 2 sprays into each nostril daily, Disp: 16 g, Rfl: 0    ondansetron (ZOFRAN-ODT) 4 mg disintegrating tablet, Take 1 tablet (4 mg total) by mouth every 8 (eight) hours as needed for nausea or vomiting, Disp: 20 tablet, Rfl: 0    oxyCODONE (Roxicodone) 5 immediate release tablet, Take 1 tablet (5 mg total) by mouth every 6 (six) hours as needed for severe pain for up to 15 doses Max Daily Amount: 20 mg, Disp: 15 tablet, Rfl: 0    cetirizine (ZyrTEC) 10 mg tablet, Take 1 tablet (10 mg total) by mouth daily (Patient not taking: Reported on 4/21/2025), Disp: 30 tablet, Rfl: 0  No current facility-administered medications for this visit.    ALLERGIES:  Allergies   Allergen Reactions    Demerol [Meperidine] Vomiting    Oxycodone Vomiting       Review of systems:   Constitutional: Negative for fatigue, fever or loss of apetite.   HENT: Negative.    Respiratory: Negative for shortness of breath, dyspnea.    Cardiovascular: Negative for chest pain/tightness.   Gastrointestinal: Negative for abdominal pain, N/V.   Endocrine: Negative for cold/heat intolerance, unexplained weight loss/gain.   Genitourinary: Negative for flank pain, dysuria, hematuria.   Musculoskeletal: As noted in HPI   Skin: Negative for rash.    Neurological: Negative  Psychiatric/Behavioral: Negative for  agitation.  _____________________________________________________  PHYSICAL EXAMINATION:    Height 5' (1.524 m), weight 82.1 kg (180 lb 14.4 oz).    General: well developed and well nourished, alert, oriented times 3, and appears comfortable  Psychiatric: Normal  HEENT: Benign  Cardiovascular: Regular    Pulmonary: No wheezing or stridor  Abdomen: Soft, Nontender  Skin: No masses, erythema, lacerations, fluctation, ulcerations  Neurovascular: Sensory and motor grossly intact    MUSCULOSKELETAL EXAMINATION:    right ankle -   Patient is nonweightbearing and presents in a wheelchair.  No anatomical deformity  Skin is warm and dry to touch with no signs of erythema, ecchymosis, infection  Mild generalized soft tissue swelling or effusion noted  Calf compartments are soft and supple  2+ TP and DP pulses with brisk capillary refill to the toes  Sural, saphenous, tibial, superficial, and deep peroneal motor and sensory distributions intact  Sensation to light touch intact distally      _____________________________________________________  STUDIES REVIEWED:  X-Ray of right ankle obtained on 4/20/2025 were reviewed and demonstrate  a bimalleolar fracture of the right ankle with displacement .    The x-ray report was reviewed.    The ER note was reviewed.      Scribe Attestation      I,:  Ivis Pinedo am acting as a scribe while in the presence of the attending physician.:       I,:  Brian Au, DO personally performed the services described in this documentation    as scribed in my presence.:

## 2025-04-22 ENCOUNTER — APPOINTMENT (OUTPATIENT)
Dept: RADIOLOGY | Facility: HOSPITAL | Age: 65
End: 2025-04-22
Payer: COMMERCIAL

## 2025-04-22 ENCOUNTER — ANESTHESIA (OUTPATIENT)
Dept: PERIOP | Facility: HOSPITAL | Age: 65
End: 2025-04-22
Payer: COMMERCIAL

## 2025-04-22 ENCOUNTER — HOSPITAL ENCOUNTER (OUTPATIENT)
Facility: HOSPITAL | Age: 65
Setting detail: OUTPATIENT SURGERY
Discharge: HOME | End: 2025-04-23
Attending: ORTHOPAEDIC SURGERY | Admitting: ORTHOPAEDIC SURGERY
Payer: COMMERCIAL

## 2025-04-22 ENCOUNTER — ANESTHESIA EVENT (OUTPATIENT)
Dept: PERIOP | Facility: HOSPITAL | Age: 65
End: 2025-04-22
Payer: COMMERCIAL

## 2025-04-22 DIAGNOSIS — K21.00 GASTROESOPHAGEAL REFLUX DISEASE WITH ESOPHAGITIS, UNSPECIFIED WHETHER HEMORRHAGE: ICD-10-CM

## 2025-04-22 DIAGNOSIS — S82.841A CLOSED DISPLACED BIMALLEOLAR FRACTURE OF RIGHT ANKLE, INITIAL ENCOUNTER: Primary | ICD-10-CM

## 2025-04-22 PROCEDURE — C1713 ANCHOR/SCREW BN/BN,TIS/BN: HCPCS | Performed by: ORTHOPAEDIC SURGERY

## 2025-04-22 PROCEDURE — C1769 GUIDE WIRE: HCPCS | Performed by: ORTHOPAEDIC SURGERY

## 2025-04-22 PROCEDURE — 73610 X-RAY EXAM OF ANKLE: CPT

## 2025-04-22 PROCEDURE — 27814 TREATMENT OF ANKLE FRACTURE: CPT | Performed by: ORTHOPAEDIC SURGERY

## 2025-04-22 PROCEDURE — 99222 1ST HOSP IP/OBS MODERATE 55: CPT | Performed by: FAMILY MEDICINE

## 2025-04-22 PROCEDURE — 77071 MNL APPL STRS JT RADIOGRAPHY: CPT | Performed by: ORTHOPAEDIC SURGERY

## 2025-04-22 PROCEDURE — 27814 TREATMENT OF ANKLE FRACTURE: CPT | Performed by: PHYSICIAN ASSISTANT

## 2025-04-22 DEVICE — 4.0MM CANNULATED SCREW SHORT THREAD/44MM: Type: IMPLANTABLE DEVICE | Site: ANKLE | Status: FUNCTIONAL

## 2025-04-22 DEVICE — 3.5MM CORTEX SCREW SELF-TAPPING 14MM: Type: IMPLANTABLE DEVICE | Site: ANKLE | Status: FUNCTIONAL

## 2025-04-22 DEVICE — 2.7MM LOCKING SCREW SLF-TPNG WITH T8 STARDRIVE RECESS 14MM: Type: IMPLANTABLE DEVICE | Site: ANKLE | Status: FUNCTIONAL

## 2025-04-22 DEVICE — 2.7MM/3.5MM LCP LATERAL DISTAL FIBULA PLATE 4H/RIGHT/86MM
Type: IMPLANTABLE DEVICE | Site: ANKLE | Status: FUNCTIONAL
Brand: LCP

## 2025-04-22 DEVICE — 2.7MM LOCKING SCREW SLF-TPNG WITH T8 STARDRIVE RECESS 16MM: Type: IMPLANTABLE DEVICE | Site: ANKLE | Status: FUNCTIONAL

## 2025-04-22 DEVICE — 3.5MM CORTEX SCREW SELF-TAPPING 12MM: Type: IMPLANTABLE DEVICE | Site: ANKLE | Status: FUNCTIONAL

## 2025-04-22 RX ORDER — DOCUSATE SODIUM 100 MG/1
100 CAPSULE, LIQUID FILLED ORAL 2 TIMES DAILY
Status: DISCONTINUED | OUTPATIENT
Start: 2025-04-22 | End: 2025-04-23 | Stop reason: HOSPADM

## 2025-04-22 RX ORDER — ONDANSETRON 2 MG/ML
4 INJECTION INTRAMUSCULAR; INTRAVENOUS EVERY 6 HOURS PRN
Status: DISCONTINUED | OUTPATIENT
Start: 2025-04-22 | End: 2025-04-22

## 2025-04-22 RX ORDER — FLUTICASONE PROPIONATE 50 MCG
2 SPRAY, SUSPENSION (ML) NASAL DAILY
Status: DISCONTINUED | OUTPATIENT
Start: 2025-04-23 | End: 2025-04-23 | Stop reason: HOSPADM

## 2025-04-22 RX ORDER — SODIUM CHLORIDE, SODIUM LACTATE, POTASSIUM CHLORIDE, CALCIUM CHLORIDE 600; 310; 30; 20 MG/100ML; MG/100ML; MG/100ML; MG/100ML
INJECTION, SOLUTION INTRAVENOUS CONTINUOUS PRN
Status: DISCONTINUED | OUTPATIENT
Start: 2025-04-22 | End: 2025-04-22

## 2025-04-22 RX ORDER — PHENYLEPHRINE HCL IN 0.9% NACL 1 MG/10 ML
SYRINGE (ML) INTRAVENOUS AS NEEDED
Status: DISCONTINUED | OUTPATIENT
Start: 2025-04-22 | End: 2025-04-22

## 2025-04-22 RX ORDER — ACETAMINOPHEN 10 MG/ML
1000 INJECTION, SOLUTION INTRAVENOUS ONCE
Status: COMPLETED | OUTPATIENT
Start: 2025-04-22 | End: 2025-04-22

## 2025-04-22 RX ORDER — ACETAMINOPHEN 10 MG/ML
1000 INJECTION, SOLUTION INTRAVENOUS ONCE
Status: DISCONTINUED | OUTPATIENT
Start: 2025-04-22 | End: 2025-04-22 | Stop reason: SDUPTHER

## 2025-04-22 RX ORDER — MIDAZOLAM HYDROCHLORIDE 2 MG/2ML
INJECTION, SOLUTION INTRAMUSCULAR; INTRAVENOUS AS NEEDED
Status: DISCONTINUED | OUTPATIENT
Start: 2025-04-22 | End: 2025-04-22

## 2025-04-22 RX ORDER — SCOPOLAMINE 1 MG/3D
1 PATCH, EXTENDED RELEASE TRANSDERMAL
Status: DISCONTINUED | OUTPATIENT
Start: 2025-04-22 | End: 2025-04-22

## 2025-04-22 RX ORDER — DEXAMETHASONE SODIUM PHOSPHATE 10 MG/ML
INJECTION, SOLUTION INTRAMUSCULAR; INTRAVENOUS AS NEEDED
Status: DISCONTINUED | OUTPATIENT
Start: 2025-04-22 | End: 2025-04-22

## 2025-04-22 RX ORDER — MAGNESIUM HYDROXIDE 1200 MG/15ML
LIQUID ORAL AS NEEDED
Status: DISCONTINUED | OUTPATIENT
Start: 2025-04-22 | End: 2025-04-22 | Stop reason: HOSPADM

## 2025-04-22 RX ORDER — OXYCODONE HYDROCHLORIDE 5 MG/1
5 TABLET ORAL EVERY 4 HOURS PRN
Status: DISCONTINUED | OUTPATIENT
Start: 2025-04-22 | End: 2025-04-23 | Stop reason: HOSPADM

## 2025-04-22 RX ORDER — ONDANSETRON 2 MG/ML
INJECTION INTRAMUSCULAR; INTRAVENOUS AS NEEDED
Status: DISCONTINUED | OUTPATIENT
Start: 2025-04-22 | End: 2025-04-22

## 2025-04-22 RX ORDER — CEFAZOLIN SODIUM 2 G/50ML
2000 SOLUTION INTRAVENOUS ONCE
Status: COMPLETED | OUTPATIENT
Start: 2025-04-22 | End: 2025-04-22

## 2025-04-22 RX ORDER — ACETAMINOPHEN 325 MG/1
650 TABLET ORAL EVERY 6 HOURS PRN
Status: DISCONTINUED | OUTPATIENT
Start: 2025-04-22 | End: 2025-04-22

## 2025-04-22 RX ORDER — HYDROMORPHONE HCL/PF 1 MG/ML
SYRINGE (ML) INJECTION AS NEEDED
Status: DISCONTINUED | OUTPATIENT
Start: 2025-04-22 | End: 2025-04-22

## 2025-04-22 RX ORDER — ACETAMINOPHEN 10 MG/ML
1000 INJECTION, SOLUTION INTRAVENOUS EVERY 12 HOURS PRN
Status: DISCONTINUED | OUTPATIENT
Start: 2025-04-22 | End: 2025-04-23

## 2025-04-22 RX ORDER — IBUPROFEN 400 MG/1
800 TABLET, FILM COATED ORAL EVERY 8 HOURS PRN
Status: DISCONTINUED | OUTPATIENT
Start: 2025-04-22 | End: 2025-04-22

## 2025-04-22 RX ORDER — CHLORHEXIDINE GLUCONATE ORAL RINSE 1.2 MG/ML
15 SOLUTION DENTAL ONCE
Status: COMPLETED | OUTPATIENT
Start: 2025-04-22 | End: 2025-04-22

## 2025-04-22 RX ORDER — BUPIVACAINE HYDROCHLORIDE AND EPINEPHRINE 2.5; 5 MG/ML; UG/ML
INJECTION, SOLUTION INFILTRATION; PERINEURAL AS NEEDED
Status: DISCONTINUED | OUTPATIENT
Start: 2025-04-22 | End: 2025-04-22 | Stop reason: HOSPADM

## 2025-04-22 RX ORDER — LIDOCAINE HYDROCHLORIDE 10 MG/ML
INJECTION, SOLUTION EPIDURAL; INFILTRATION; INTRACAUDAL; PERINEURAL AS NEEDED
Status: DISCONTINUED | OUTPATIENT
Start: 2025-04-22 | End: 2025-04-22

## 2025-04-22 RX ORDER — HYDROCODONE BITARTRATE AND ACETAMINOPHEN 5; 325 MG/1; MG/1
1 TABLET ORAL EVERY 6 HOURS PRN
Status: DISCONTINUED | OUTPATIENT
Start: 2025-04-22 | End: 2025-04-22

## 2025-04-22 RX ORDER — FENTANYL CITRATE 50 UG/ML
INJECTION, SOLUTION INTRAMUSCULAR; INTRAVENOUS AS NEEDED
Status: DISCONTINUED | OUTPATIENT
Start: 2025-04-22 | End: 2025-04-22

## 2025-04-22 RX ORDER — ENOXAPARIN SODIUM 100 MG/ML
40 INJECTION SUBCUTANEOUS DAILY
Status: DISCONTINUED | OUTPATIENT
Start: 2025-04-22 | End: 2025-04-23 | Stop reason: HOSPADM

## 2025-04-22 RX ORDER — ACETAMINOPHEN 325 MG/1
650 TABLET ORAL EVERY 8 HOURS
Status: DISCONTINUED | OUTPATIENT
Start: 2025-04-22 | End: 2025-04-23

## 2025-04-22 RX ORDER — PROPOFOL 10 MG/ML
INJECTION, EMULSION INTRAVENOUS AS NEEDED
Status: DISCONTINUED | OUTPATIENT
Start: 2025-04-22 | End: 2025-04-22

## 2025-04-22 RX ORDER — KETOROLAC TROMETHAMINE 30 MG/ML
30 INJECTION, SOLUTION INTRAMUSCULAR; INTRAVENOUS ONCE
Status: COMPLETED | OUTPATIENT
Start: 2025-04-22 | End: 2025-04-22

## 2025-04-22 RX ORDER — IBUPROFEN 800 MG/1
800 TABLET, FILM COATED ORAL EVERY 6 HOURS PRN
COMMUNITY
End: 2025-04-23

## 2025-04-22 RX ORDER — IBUPROFEN 800 MG/1
800 TABLET, FILM COATED ORAL EVERY 8 HOURS PRN
Qty: 50 TABLET | Refills: 0 | Status: CANCELLED | OUTPATIENT
Start: 2025-04-22

## 2025-04-22 RX ORDER — MORPHINE SULFATE 10 MG/ML
2 INJECTION, SOLUTION INTRAMUSCULAR; INTRAVENOUS EVERY 2 HOUR PRN
Status: DISCONTINUED | OUTPATIENT
Start: 2025-04-22 | End: 2025-04-23

## 2025-04-22 RX ORDER — ONDANSETRON 2 MG/ML
4 INJECTION INTRAMUSCULAR; INTRAVENOUS EVERY 6 HOURS PRN
Status: DISCONTINUED | OUTPATIENT
Start: 2025-04-22 | End: 2025-04-23 | Stop reason: HOSPADM

## 2025-04-22 RX ORDER — ACETAMINOPHEN 325 MG/1
975 TABLET ORAL ONCE
Status: DISCONTINUED | OUTPATIENT
Start: 2025-04-22 | End: 2025-04-22

## 2025-04-22 RX ORDER — ROCURONIUM BROMIDE 10 MG/ML
INJECTION, SOLUTION INTRAVENOUS AS NEEDED
Status: DISCONTINUED | OUTPATIENT
Start: 2025-04-22 | End: 2025-04-22

## 2025-04-22 RX ORDER — IBUPROFEN 600 MG/1
600 TABLET, FILM COATED ORAL EVERY 6 HOURS PRN
Status: DISCONTINUED | OUTPATIENT
Start: 2025-04-22 | End: 2025-04-23 | Stop reason: HOSPADM

## 2025-04-22 RX ORDER — HYDROMORPHONE HCL/PF 1 MG/ML
0.5 SYRINGE (ML) INJECTION
Status: COMPLETED | OUTPATIENT
Start: 2025-04-22 | End: 2025-04-22

## 2025-04-22 RX ORDER — ASPIRIN 325 MG
325 TABLET ORAL DAILY
Qty: 30 TABLET | Refills: 0 | Status: SHIPPED | OUTPATIENT
Start: 2025-04-22

## 2025-04-22 RX ADMIN — HYDROMORPHONE HYDROCHLORIDE 0.5 MG: 1 INJECTION, SOLUTION INTRAMUSCULAR; INTRAVENOUS; SUBCUTANEOUS at 11:31

## 2025-04-22 RX ADMIN — CHLORHEXIDINE GLUCONATE 15 ML: 1.2 SOLUTION ORAL at 10:03

## 2025-04-22 RX ADMIN — DEXAMETHASONE SODIUM PHOSPHATE 5 MG: 10 INJECTION, SOLUTION INTRAMUSCULAR; INTRAVENOUS at 10:59

## 2025-04-22 RX ADMIN — Medication 50 MCG: at 10:59

## 2025-04-22 RX ADMIN — SUGAMMADEX 200 MG: 100 INJECTION, SOLUTION INTRAVENOUS at 12:01

## 2025-04-22 RX ADMIN — HYDROMORPHONE HYDROCHLORIDE 0.5 MG: 1 INJECTION, SOLUTION INTRAMUSCULAR; INTRAVENOUS; SUBCUTANEOUS at 12:46

## 2025-04-22 RX ADMIN — LIDOCAINE HYDROCHLORIDE 50 MG: 10 INJECTION, SOLUTION EPIDURAL; INFILTRATION; INTRACAUDAL; PERINEURAL at 10:49

## 2025-04-22 RX ADMIN — HYDROMORPHONE HYDROCHLORIDE 0.5 MG: 1 INJECTION, SOLUTION INTRAMUSCULAR; INTRAVENOUS; SUBCUTANEOUS at 12:55

## 2025-04-22 RX ADMIN — ENOXAPARIN SODIUM 40 MG: 40 INJECTION SUBCUTANEOUS at 21:25

## 2025-04-22 RX ADMIN — MIDAZOLAM HYDROCHLORIDE 1 MG: 1 INJECTION, SOLUTION INTRAMUSCULAR; INTRAVENOUS at 10:43

## 2025-04-22 RX ADMIN — ACETAMINOPHEN 1000 MG: 1000 INJECTION INTRAVENOUS at 11:02

## 2025-04-22 RX ADMIN — CEFAZOLIN SODIUM 2000 MG: 2 SOLUTION INTRAVENOUS at 10:54

## 2025-04-22 RX ADMIN — PROPOFOL 50 MCG/KG/MIN: 10 INJECTION, EMULSION INTRAVENOUS at 10:52

## 2025-04-22 RX ADMIN — DOCUSATE SODIUM 100 MG: 100 CAPSULE, LIQUID FILLED ORAL at 18:35

## 2025-04-22 RX ADMIN — FENTANYL CITRATE 50 MCG: 0.05 INJECTION, SOLUTION INTRAMUSCULAR; INTRAVENOUS at 11:05

## 2025-04-22 RX ADMIN — FENTANYL CITRATE 50 MCG: 0.05 INJECTION, SOLUTION INTRAMUSCULAR; INTRAVENOUS at 10:49

## 2025-04-22 RX ADMIN — PROPOFOL 200 MG: 10 INJECTION, EMULSION INTRAVENOUS at 10:49

## 2025-04-22 RX ADMIN — ACETAMINOPHEN 650 MG: 325 TABLET ORAL at 18:35

## 2025-04-22 RX ADMIN — ONDANSETRON 4 MG: 2 INJECTION INTRAMUSCULAR; INTRAVENOUS at 11:58

## 2025-04-22 RX ADMIN — HYDROMORPHONE HYDROCHLORIDE 0.5 MG: 1 INJECTION, SOLUTION INTRAMUSCULAR; INTRAVENOUS; SUBCUTANEOUS at 12:39

## 2025-04-22 RX ADMIN — SCOPOLAMINE 1 PATCH: 1.5 PATCH, EXTENDED RELEASE TRANSDERMAL at 10:04

## 2025-04-22 RX ADMIN — ROCURONIUM BROMIDE 50 MG: 10 INJECTION, SOLUTION INTRAVENOUS at 10:49

## 2025-04-22 RX ADMIN — SODIUM CHLORIDE, SODIUM LACTATE, POTASSIUM CHLORIDE, AND CALCIUM CHLORIDE: .6; .31; .03; .02 INJECTION, SOLUTION INTRAVENOUS at 10:43

## 2025-04-22 RX ADMIN — KETOROLAC TROMETHAMINE 30 MG: 30 INJECTION, SOLUTION INTRAMUSCULAR; INTRAVENOUS at 13:18

## 2025-04-22 NOTE — ANESTHESIA POSTPROCEDURE EVALUATION
Post-Op Assessment Note    CV Status:  Stable  Pain Score: 0    Pain management: adequate    Multimodal analgesia used between 6 hours prior to anesthesia start to PACU discharge    Mental Status:  Sleepy and arousable   Hydration Status:  Stable and euvolemic   PONV Controlled:  None   Airway Patency:  Patent and adequate     Post Op Vitals Reviewed: Yes    No anethesia notable event occurred.    Staff: CRNA           Last Filed PACU Vitals:  Vitals Value Taken Time   Temp 97.7    Pulse 102 04/22/25 1228   /63 04/22/25 1228   Resp 16    SpO2 100 % 04/22/25 1228   Vitals shown include unfiled device data.

## 2025-04-22 NOTE — ANESTHESIA PREPROCEDURE EVALUATION
Procedure:  OPEN REDUCTION W/ INTERNAL FIXATION (ORIF) ANKLE (Right: Ankle)    Relevant Problems   ANESTHESIA   (+) PONV (postoperative nausea and vomiting)      GI/HEPATIC   (+) GERD (gastroesophageal reflux disease)        Physical Exam    Airway    Mallampati score: II  TM Distance: >3 FB  Neck ROM: full     Dental   No notable dental hx     Cardiovascular  Cardiovascular exam normal    Pulmonary  Pulmonary exam normal     Other Findings  post-pubertal.  Normal sinus rhythm  Normal ECG  When compared with ECG of 17-Jun-2024 15:25,  No significant change was found        Anesthesia Plan  ASA Score- 2     Anesthesia Type- general with ASA Monitors.         Additional Monitors:     Airway Plan:     Comment: Adductor canal and popliteal block-patient refused.       Plan Factors-Exercise tolerance (METS): >4 METS.    Chart reviewed. EKG reviewed. Imaging results reviewed. Existing labs reviewed. Patient summary reviewed.    Patient is not a current smoker.      Obstructive sleep apnea risk education given perioperatively.        Induction- intravenous.    Postoperative Plan-     Perioperative Resuscitation Plan - Level 1 - Full Code.       Informed Consent-       NPO Status:  No vitals data found for the desired time range.

## 2025-04-22 NOTE — H&P
H&P - Hospitalist   Name: Brooklyn Vazquez 64 y.o. female I MRN: [id number]  Unit/Bed#: -01 I Date of Admission: 4/22/2025   Date of Service: 4/22/2025 I Hospital Day: 0     Assessment & Plan  Closed displaced bimalleolar fracture of right lower leg  Patient had fall at home on 4/20 from a stepstool which resulted in right bimalleolar fracture.  Underwent Right - OPEN REDUCTION W/ INTERNAL FIXATION (ORIF) ANKLE today and is currently postop day 0.  Will continue oral and IV Tylenol along with oral Motrin for pain control.  PT OT eval in a.m. and further recommendations as per orthopedics  Lovenox for dvt prophylaxis  PONV (postoperative nausea and vomiting)  She had some nausea.  Will continue  Zofran prn      VTE Pharmacologic Prophylaxis:   Moderate Risk (Score 3-4) - Pharmacological DVT Prophylaxis Ordered: enoxaparin (Lovenox).  Code Status: Level 1 - Full Code as per patient  Discussion with family: Updated  (son) at bedside.    Anticipated Length of Stay: outpt bed    History of Present Illness   Chief Complaint: Foot pain and dizziness    Brooklyn Vazquez is a 64 y.o. female with a PMH of right ankle fracture who presents with nausea dizziness and right foot pain.  Patient fell at home on 4/20 and had a right ankle fracture today she presented for surgery and underwent right ankle ORIF however postop she is having problems with nausea dizziness and right foot pain and she lives alone and was not felt to be a safe discharge and was kept overnight for further evaluation by physical therapy and Occupational Therapy and further disposition planning in a.m. initially the patient had 8 to 9 x 10 pain and now her pain is down to 5 x 10 in the right ankle    Review of Systems   Constitutional:  Positive for fatigue. Negative for appetite change, chills and fever.   HENT:  Negative for hearing loss, sore throat and trouble swallowing.    Eyes:  Negative for photophobia, discharge and visual  disturbance.   Respiratory:  Negative for chest tightness and shortness of breath.    Cardiovascular:  Negative for chest pain and palpitations.   Gastrointestinal:  Negative for abdominal pain, blood in stool and vomiting.   Endocrine: Negative for polydipsia and polyuria.   Genitourinary:  Negative for difficulty urinating, dysuria, flank pain and hematuria.   Musculoskeletal:  Positive for gait problem. Negative for back pain.   Skin:  Negative for rash.   Allergic/Immunologic: Negative for environmental allergies and food allergies.   Neurological:  Negative for dizziness, seizures, syncope and headaches.   Hematological:  Does not bruise/bleed easily.   Psychiatric/Behavioral:  Negative for behavioral problems.    All other systems reviewed and are negative.      Historical Information   Past Medical History:   Diagnosis Date    GERD (gastroesophageal reflux disease)     Known health problems: none     PONV (postoperative nausea and vomiting)      Past Surgical History:   Procedure Laterality Date    APPENDECTOMY      FRACTURE SURGERY Right     arm    HYSTERECTOMY      MENISCECTOMY       Social History     Tobacco Use    Smoking status: Never     Passive exposure: Never    Smokeless tobacco: Never   Vaping Use    Vaping status: Never Used   Substance and Sexual Activity    Alcohol use: Not Currently     Comment: occasional    Drug use: No    Sexual activity: Not on file     Comment: defer     E-Cigarette/Vaping    E-Cigarette Use Never User      E-Cigarette/Vaping Substances    Nicotine No     THC No     CBD No     Flavoring No      Family History   Problem Relation Age of Onset    Hypertension Father      Social History:  Marital Status: /Civil Union     Meds/Allergies   I have reviewed home medications with patient personally.  Prior to Admission medications    Medication Sig Start Date End Date Taking? Authorizing Provider   aspirin 325 mg tablet Take 1 tablet (325 mg total) by mouth daily 4/22/25   Yes Kalyan Warren PA-C   ibuprofen (MOTRIN) 800 mg tablet Take 800 mg by mouth every 6 (six) hours as needed for mild pain   Yes Historical Provider, MD   acetaminophen (TYLENOL) 325 mg tablet Take 650 mg by mouth every 6 (six) hours as needed for mild pain    Historical Provider, MD   fluticasone (FLONASE) 50 mcg/act nasal spray 2 sprays into each nostril daily 1/10/19   Sonny Nelson PA-C   ondansetron (ZOFRAN-ODT) 4 mg disintegrating tablet Take 1 tablet (4 mg total) by mouth every 8 (eight) hours as needed for nausea or vomiting 4/21/25   Henrique Presley DO   oxyCODONE (Roxicodone) 5 immediate release tablet Take 1 tablet (5 mg total) by mouth every 6 (six) hours as needed for severe pain for up to 15 doses Max Daily Amount: 20 mg 4/21/25   Henrique Presley DO   cetirizine (ZyrTEC) 10 mg tablet Take 1 tablet (10 mg total) by mouth daily  Patient not taking: Reported on 4/21/2025 1/10/19 4/22/25  Sonny Nelson PA-C     Allergies   Allergen Reactions    Demerol [Meperidine] Vomiting    Oxycodone Vomiting       Objective :  Temp:  [97 °F (36.1 °C)-97.7 °F (36.5 °C)] 97 °F (36.1 °C)  HR:  [] 73  BP: ()/(50-72) 127/72  Resp:  [16-18] 18  SpO2:  [90 %-100 %] 100 %  O2 Device: Nasal cannula  Nasal Cannula O2 Flow Rate (L/min):  [2 L/min] 2 L/min    Physical Exam  Vitals and nursing note reviewed.   Constitutional:       Appearance: Normal appearance.   HENT:      Head: Normocephalic and atraumatic.      Right Ear: External ear normal.      Left Ear: External ear normal.      Nose: Nose normal.      Mouth/Throat:      Pharynx: Oropharynx is clear.   Cardiovascular:      Rate and Rhythm: Normal rate and regular rhythm.      Heart sounds: Normal heart sounds.   Pulmonary:      Effort: Pulmonary effort is normal.      Breath sounds: Normal breath sounds.   Abdominal:      General: Bowel sounds are normal.      Palpations: Abdomen is soft.      Tenderness: There is no abdominal tenderness.    Musculoskeletal:         General: Normal range of motion.      Cervical back: Normal range of motion and neck supple.      Comments: Rt ankle tenderness with splint   Skin:     General: Skin is warm and dry.      Capillary Refill: Capillary refill takes less than 2 seconds.   Neurological:      General: No focal deficit present.      Mental Status: She is alert and oriented to person, place, and time.   Psychiatric:         Mood and Affect: Mood normal.            Lines/Drains:            Lab Results: I have reviewed the following results:                  Lab Results   Component Value Date    HGBA1C 5.5 01/08/2024    HGBA1C 5.7 (H) 11/16/2021    HGBA1C 5.7 (H) 10/14/2020           Imaging Results Review: I reviewed radiology reports from this admission including: xray(s).  Other Study Results Review: EKG was reviewed.     Administrative Statements       ** Please Note: This note has been constructed using a voice recognition system. **

## 2025-04-22 NOTE — ASSESSMENT & PLAN NOTE
Patient had fall at home on 4/20 from a stepstool which resulted in right bimalleolar fracture.  Underwent Right - OPEN REDUCTION W/ INTERNAL FIXATION (ORIF) ANKLE today and is currently postop day 0.  Will continue oral and IV Tylenol along with oral Motrin for pain control.  PT OT eval in a.m. and further recommendations as per orthopedics  Lovenox for dvt prophylaxis

## 2025-04-22 NOTE — DISCHARGE INSTR - AVS FIRST PAGE
Discharge Instructions - Orthopedics  Brooklyn Vazquez 64 y.o. female MRN: [id number]  Unit/Bed#: OW OR MAIN    Weight Bearing Status:                                           NWB right leg with walker or crutches    DVT prophylaxis:  ASA 325mg BID    Pain:  For pain control take Tylenol 1,000 mg every 8 hours around the clock.  Supplement with the narcotic pain medication, only if needed every 4-6 hours.  Ice bag to the surgical area for 20-30 minutes 4-6 times per day with elevation for swelling and pain control.  A prescription was sent to your pharmacy.  You may take ibuprofen 600 mg every 8 hours as needed but this should be taken with food.    Dressing Instructions:   Keep splint clean, dry and intact until follow up appointment. Do not remove.   Do not shower until seen in clinic. Sponge bathe to avoid getting splint wet.     Appt Instructions:   If you do not have your appointment, please call the clinic at [phone] to f/u with Dr. Au in 1 week.    Contact the office sooner if you experience any increased numbness/tingling in the extremities.

## 2025-04-22 NOTE — PLAN OF CARE
Problem: PAIN - ADULT  Goal: Verbalizes/displays adequate comfort level or baseline comfort level  Description: Interventions:- Encourage patient to monitor pain and request assistance- Assess pain using appropriate pain scale- Administer analgesics based on type and severity of pain and evaluate response- Implement non-pharmacological measures as appropriate and evaluate response- Consider cultural and social influences on pain and pain management- Notify physician/advanced practitioner if interventions unsuccessful or patient reports new pain  Outcome: Progressing     Problem: SAFETY ADULT  Goal: Patient will remain free of falls  Description: INTERVENTIONS:- Educate patient/family on patient safety including physical limitations- Instruct patient to call for assistance with activity - Consult OT/PT to assist with strengthening/mobility - Keep Call bell within reach- Keep bed low and locked with side rails adjusted as appropriate- Keep care items and personal belongings within reach- Initiate and maintain comfort rounds- Make Fall Risk Sign visible to staff- Offer Toileting every  Hours, in advance of need- Initiate/Maintain alarm- Obtain necessary fall risk management equipment: - Apply yellow socks and bracelet for high fall risk patients- Consider moving patient to room near nurses station  Outcome: Progressing  Goal: Maintain or return to baseline ADL function  Description: INTERVENTIONS:-  Assess patient's ability to carry out ADLs; assess patient's baseline for ADL function and identify physical deficits which impact ability to perform ADLs (bathing, care of mouth/teeth, toileting, grooming, dressing, etc.)- Assess/evaluate cause of self-care deficits - Assess range of motion- Assess patient's mobility; develop plan if impaired- Assess patient's need for assistive devices and provide as appropriate- Encourage maximum independence but intervene and supervise when necessary- Involve family in performance of  ADLs- Assess for home care needs following discharge - Consider OT consult to assist with ADL evaluation and planning for discharge- Provide patient education as appropriate  Outcome: Progressing  Goal: Maintains/Returns to pre admission functional level  Description: INTERVENTIONS:- Perform AM-PAC 6 Click Basic Mobility/ Daily Activity assessment daily.- Set and communicate daily mobility goal to care team and patient/family/caregiver. - Collaborate with rehabilitation services on mobility goals if consulted- Perform Range of Motion  times a day.- Reposition patient every  hours.- Dangle patient  times a day- Stand patient  times a day- Ambulate patient  times a day- Out of bed to chair  times a day - Out of bed for meals  times a day- Out of bed for toileting- Record patient progress and toleration of activity level   Outcome: Progressing     Problem: DISCHARGE PLANNING  Goal: Discharge to home or other facility with appropriate resources  Description: INTERVENTIONS:- Identify barriers to discharge w/patient and caregiver- Arrange for needed discharge resources and transportation as appropriate- Identify discharge learning needs (meds, wound care, etc.)- Arrange for interpretive services to assist at discharge as needed- Refer to Case Management Department for coordinating discharge planning if the patient needs post-hospital services based on physician/advanced practitioner order or complex needs related to functional status, cognitive ability, or social support system  Outcome: Progressing     Problem: Knowledge Deficit  Goal: Patient/family/caregiver demonstrates understanding of disease process, treatment plan, medications, and discharge instructions  Description: Complete learning assessment and assess knowledge base.Interventions:- Provide teaching at level of understanding- Provide teaching via preferred learning methods  Outcome: Progressing

## 2025-04-22 NOTE — ANESTHESIA POSTPROCEDURE EVALUATION
Post-Op Assessment Note    CV Status:  Stable  Pain Score: 2    Pain management: adequate       Mental Status:  Alert   PONV Controlled:  None   Airway Patency:  Patent  Two or more mitigation strategies used for obstructive sleep apnea   Post Op Vitals Reviewed: Yes    No anethesia notable event occurred.    Staff: Anesthesiologist           Last Filed PACU Vitals:  Vitals Value Taken Time   Temp 97.7 °F (36.5 °C) 04/22/25 1358   Pulse 63 04/22/25 1433   /59 04/22/25 1430   Resp 16 04/22/25 1428   SpO2 99 % 04/22/25 1433   Vitals shown include unfiled device data.    Modified Ismael:     Vitals Value Taken Time   Activity 2 04/22/25 1428   Respiration 2 04/22/25 1428   Circulation 2 04/22/25 1428   Consciousness 2 04/22/25 1428   Oxygen Saturation 1 04/22/25 1428     Modified Ismael Score: 9

## 2025-04-22 NOTE — OP NOTE
OPERATIVE REPORT  PATIENT NAME: Brooklyn Vazquez    :  1960  MRN: [id number]  Pt Location: OW OR ROOM 01    SURGERY DATE: 2025    Surgeons and Role:     * Brian Au,  - Primary     * Kalyan Warren PA-C - Assisting    Preop Diagnosis:  Closed displaced bimalleolar fracture of right ankle, initial encounter [S82.841A]    Post-Op Diagnosis Codes:     * Closed displaced bimalleolar fracture of right ankle, initial encounter [S82.841A]    Procedure(s):  Right - OPEN REDUCTION W/ INTERNAL FIXATION (ORIF) ANKLE    Fluids: 800 cc    Estimated Blood Loss:   5 mL    Tourniquet time: 71 minutes at 300 mmHg    Anesthesia Type:   General With supplemental local utilizing 30 cc of 0.25% Marcaine with epinephrine    Operative Indications:  Closed displaced bimalleolar fracture of right ankle, initial encounter [S82.841A]  Brooklyn is a 64-year-old female who fell injuring her right ankle on 2025.  She was seen in the emergency room at [facility], a splint was applied and she was seen in my office on 2025.  The risk, benefits, options and alternatives of treatment were discussed and it was elected to proceed with surgical fixation.    Operative Findings:  At the time of the procedure, a transverse fracture of the distal fibula was noted which was fixated with a 4-hole distal fibular locking plate with anatomic reduction.  Cotton test demonstrated no evidence of syndesmotic widening.  The distal tibial medial malleolus fracture was slightly more proximal than usual and there was loss of some bone from the medial aspect of the fracture site.  There was slight comminution at the articular surface.  The main fragment of fracture was reduced and secured with 2 cannulated screws with fluoroscopic imaging demonstrating good restoration of the articular surface.      Complications:   None    Procedure and Technique:  Patient was taken to the operating room and administered a general anesthetic. A  well-padded tourniquet was then applied to the proximal right lower extremity. The limb was then prepped and draped in standard fashion. A time-out was performed and preoperative antibiotics administered.  Limb was elevated, exsanguinated with an Esmarch bandage and the tourniquet then inflated to 300 mmHg. A lateral incision was then made guided by fluoroscopy over the distal fibula. Sharp dissection was carried down through the skin and subcutaneous tissues. The sural nerve was protected and the distal fibula then exposed subperiosteally. The fracture was mobilized, cleansed of debris and irrigated. The fracture was then reduced and secured with a longitudinally inserted K wire.  A distal fibular 4-hole locking plate was then applied and secured proximal and distal to the fracture site. Fluoroscopic images were obtained demonstrating excellent alignment. Remaining screws were placed to provide adequate fixation.  Cotton test was performed demonstrating no evidence of syndesmotic widening.  Fluoroscopic images in the AP, lateral and mortise views were obtained demonstrating excellent fracture alignment and stable fixation.  A medial incision was then made over the distal tibia curved anteriorly at the distal extent. Sharp dissection carried down through the skin. Soft tissues were then bluntly dissected to expose the fracture site. The distal tibia at the medial malleolar level was exposed subperiosteally. The fracture site was irrigated, cleansed of debris and mobilized. The fracture was then reduced and secured with guidewires for the cannulated screw system.  Once adequate alignment was obtained, and documented with fluoroscopic images, the fracture secured utilizing 2 44 mm cannulated screws inserted in standard fashion. Fluoroscopic images were obtained documenting good alignment of the fracture with proper screw placement.  Final fluoroscopic images in the AP, lateral and mortise views were obtained  demonstrating the fracture to be in good alignment with stable fixation. Wounds were irrigated with saline solution and then closed with 0 Vicryl for the deltoid ligament and peroneal fascia.  2-0 Vicryl was used for subcutaneous tissues and 3-0 nylon for the skin. Dressings were applied consisting of Xeroform, gauze, Webril and the tourniquet then deflated after total tourniquet time of 71 minutes. A U splint was then applied secured with Ace bandages and the patient transferred to the recovery room in stable and satisfactory postoperative condition.   I was present for the entire procedure., A qualified resident physician was not available., and A physician assistant was required during the procedure for retraction, tissue handling, dissection and suturing.    Patient Disposition:  PACU              SIGNATURE: Brian Au DO  DATE: April 22, 2025  TIME: 12:28 PM

## 2025-04-22 NOTE — INTERVAL H&P NOTE
H&P reviewed. After examining the patient I find no changes in the patients condition since the H&P had been written.    Vitals:    04/22/25 0910   BP: 129/63   Pulse: 80   Resp: 16   Temp: 97.6 °F (36.4 °C)   SpO2: 96%

## 2025-04-23 VITALS
SYSTOLIC BLOOD PRESSURE: 113 MMHG | OXYGEN SATURATION: 97 % | HEART RATE: 90 BPM | TEMPERATURE: 97.9 F | HEIGHT: 60 IN | BODY MASS INDEX: 34.8 KG/M2 | WEIGHT: 177.25 LBS | RESPIRATION RATE: 18 BRPM | DIASTOLIC BLOOD PRESSURE: 66 MMHG

## 2025-04-23 PROBLEM — D64.9 ANEMIA: Status: ACTIVE | Noted: 2025-04-23

## 2025-04-23 PROBLEM — R00.2 PALPITATIONS: Status: ACTIVE | Noted: 2025-04-23

## 2025-04-23 LAB
ANION GAP SERPL CALCULATED.3IONS-SCNC: 6 MMOL/L (ref 4–13)
BUN SERPL-MCNC: 15 MG/DL (ref 5–25)
CALCIUM SERPL-MCNC: 8.5 MG/DL (ref 8.4–10.2)
CHLORIDE SERPL-SCNC: 109 MMOL/L (ref 96–108)
CO2 SERPL-SCNC: 26 MMOL/L (ref 21–32)
CREAT SERPL-MCNC: 0.9 MG/DL (ref 0.6–1.3)
ERYTHROCYTE [DISTWIDTH] IN BLOOD BY AUTOMATED COUNT: 14.6 % (ref 11.6–15.1)
GFR SERPL CREATININE-BSD FRML MDRD: 67 ML/MIN/1.73SQ M
GLUCOSE SERPL-MCNC: 96 MG/DL (ref 65–140)
HCT VFR BLD AUTO: 31.7 % (ref 34.8–46.1)
HGB BLD-MCNC: 9.7 G/DL (ref 11.5–15.4)
MCH RBC QN AUTO: 27.6 PG (ref 26.8–34.3)
MCHC RBC AUTO-ENTMCNC: 30.6 G/DL (ref 31.4–37.4)
MCV RBC AUTO: 90 FL (ref 82–98)
PLATELET # BLD AUTO: 217 THOUSANDS/UL (ref 149–390)
PMV BLD AUTO: 9.6 FL (ref 8.9–12.7)
POTASSIUM SERPL-SCNC: 3.8 MMOL/L (ref 3.5–5.3)
RBC # BLD AUTO: 3.51 MILLION/UL (ref 3.81–5.12)
SODIUM SERPL-SCNC: 141 MMOL/L (ref 135–147)
WBC # BLD AUTO: 7.87 THOUSAND/UL (ref 4.31–10.16)

## 2025-04-23 PROCEDURE — 97116 GAIT TRAINING THERAPY: CPT

## 2025-04-23 PROCEDURE — 97166 OT EVAL MOD COMPLEX 45 MIN: CPT

## 2025-04-23 PROCEDURE — 80048 BASIC METABOLIC PNL TOTAL CA: CPT | Performed by: PHYSICIAN ASSISTANT

## 2025-04-23 PROCEDURE — 99024 POSTOP FOLLOW-UP VISIT: CPT | Performed by: ORTHOPAEDIC SURGERY

## 2025-04-23 PROCEDURE — 85027 COMPLETE CBC AUTOMATED: CPT

## 2025-04-23 PROCEDURE — 97535 SELF CARE MNGMENT TRAINING: CPT

## 2025-04-23 PROCEDURE — 97162 PT EVAL MOD COMPLEX 30 MIN: CPT

## 2025-04-23 PROCEDURE — 99232 SBSQ HOSP IP/OBS MODERATE 35: CPT

## 2025-04-23 RX ORDER — ACETAMINOPHEN 325 MG/1
975 TABLET ORAL EVERY 8 HOURS
Status: DISCONTINUED | OUTPATIENT
Start: 2025-04-23 | End: 2025-04-23 | Stop reason: HOSPADM

## 2025-04-23 RX ADMIN — DOCUSATE SODIUM 100 MG: 100 CAPSULE, LIQUID FILLED ORAL at 08:46

## 2025-04-23 RX ADMIN — FLUTICASONE PROPIONATE 2 SPRAY: 50 SPRAY, METERED NASAL at 08:46

## 2025-04-23 RX ADMIN — ACETAMINOPHEN 975 MG: 325 TABLET ORAL at 11:19

## 2025-04-23 RX ADMIN — IBUPROFEN 600 MG: 600 TABLET, FILM COATED ORAL at 05:25

## 2025-04-23 NOTE — PLAN OF CARE
Problem: OCCUPATIONAL THERAPY ADULT  Goal: Performs self-care activities at highest level of function for planned discharge setting.  See evaluation for individualized goals.  Description: Treatment Interventions: ADL retraining, Functional transfer training, UE strengthening/ROM, Patient/family training, Equipment evaluation/education, Compensatory technique education, Continued evaluation, Energy conservation, Activityengagement          See flowsheet documentation for full assessment, interventions and recommendations.   Note: Limitation: Decreased ADL status, Decreased Safe judgement during ADL, Decreased self-care trans, Decreased high-level ADLs  Prognosis: Good  Assessment: Pt is a 64 y.o. female, admitted to [facility] 4/22/2025 d/t experiencing closed displaced bimalleolar fracture of R ankle. Pt s/p R ankle ORIF POD #1, NWB RLE. Pt with PMHx impacting their performance during ADL tasks, including: GERD, PONV. Prior to admission to the hospital Pt was performing ADLs without physical assistance. IADLs without physical assistance. Functional transfers/ambulation without physical assistance. Cognitive status PTA was WFL. OT order placed to assess Pt's ADLs, cognitive status, and performance during functional tasks in order to maximize safety and independence while making most appropriate d/c recommendations. PT/OT co-evaluation completed at this time d/t initial [facility] mobility score of 14, new NWB status of RLE and unpredictability. Pt's clinical presentation is currently evolving given new onset deficits that affect Pt's occupational performance and ability to safely return to PLOF including decrease standing balance, decrease performance during ADL tasks, decrease safety awareness , increased pain, decrease performance during functional transfers, limited family support, and high fall risk combined with medical complications of pain impacting overall mobility status, edema/swelling, wounds, decreased skin  integrity, and need for input for mobility technique/safety. Personal factors affecting Pt at time of initial evaluation include: unable to perform caregiver support/tasks, limited home support, inability to perform IADLs, new need for AD, inability to navigate community distances, decreased initiation and engagement, and recent fall(s)/fall history. Pt will benefit from continued skilled acute OT services to address deficits as defined above and to maximize level independence/participation during ADLs and functional tasks to facilitate return toward PLOF and improved quality of life. Pt is anticipated to continue to progress well towards goals of care. From an occupational therapy standpoint, No Post-Acute Rehabilitation Needs are recommended upon d/c.     Rehab Resource Intensity Level, OT: No post-acute rehabilitation needs

## 2025-04-23 NOTE — PHYSICAL THERAPY NOTE
PHYSICAL THERAPY EVALUATION      NAME:  Brooklyn Vazquez  DATE: 04/23/25    AGE:   64 y.o.  Mrn:   [id number]  ADMIT DX:  Closed displaced bimalleolar fracture of right ankle, initial encounter [S82.840K]    Past Medical History:   Diagnosis Date    GERD (gastroesophageal reflux disease)     Known health problems: none     PONV (postoperative nausea and vomiting)      Length Of Stay: 0  Performed at least 2 patient identifiers during session: Name and Birthday      PHYSICAL THERAPY EVALUATION:       04/23/25 0854   PT Last Visit   PT Visit Date 04/23/25   Note Type   Note type Evaluation   Pain Assessment   Pain Assessment Tool 0-10   Pain Score 6   Pain Location/Orientation Orientation: Right;Orientation: Lower;Location: Leg   Restrictions/Precautions   Weight Bearing Precautions Per Order Yes   RLE Weight Bearing Per Order NWB   Other Precautions WBS;Chair Alarm;Bed Alarm;Fall Risk;Pain   Home Living   Type of Home House   Home Layout One level;Ramped entrance  (has 3 different ramps; laundry on main flr)   Bathroom Shower/Tub Walk-in shower   Bathroom Toilet Standard   Bathroom Equipment Tub transfer bench;Grab bars around toilet   Home Equipment Crutches;Walker;Wheelchair-manual  (knee scooter, BSC, RW & SW)   Prior Function   Level of [place] Independent with ADLs;Independent with functional mobility;Independent with IADLS   Lives With Spouse   Receives Help From   (son, sister in law)   IADLs Independent with driving;Independent with meal prep;Independent with medication management   Falls in the last 6 months 1 to 4   Comments IND no AD.  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   General   Additional Pertinent History Pt was putting Easter food away in the garage freezer and stepped up on the stool to switch the breaker and when she reached out shifting her weight she slipped off the stool.   Family/Caregiver Present No   Cognition   Overall Cognitive Status WFL    Arousal/Participation Cooperative   Orientation Level Oriented X4   Memory Within functional limits   LLE Assessment   LLE Assessment WNL   Light Touch   RLE Light Touch Grossly intact   Bed Mobility   Supine to Sit 6  Modified independent   Additional items HOB elevated   Transfers   Sit to Stand 5  Supervision   Stand to Sit 5  Supervision   Stand pivot 5  Supervision   Additional items Verbal cues   Additional Comments RW. Good compliance with WBing status.   Ambulation/Elevation   Gait pattern   (hopping on L LE)   Gait Assistance 5  Supervision   Additional items Verbal cues   Assistive Device Rolling walker   Distance 11 ft x 2   Balance   Static Sitting Normal   Dynamic Sitting Good   Static Standing Fair +   Dynamic Standing Fair   Ambulatory Fair +  (RW)   Activity Tolerance   Activity Tolerance Patient tolerated treatment well   Medical Staff Made Aware OT Dariana   Nurse Made Aware ELMIRA Holland   Assessment   Prognosis Excellent   Problem List Impaired balance;Decreased mobility;Orthopedic restrictions;Pain   Barriers to Discharge None   Goals   STG Expiration Date 05/07/25   Plan   Treatment/Interventions Functional transfer training;Therapeutic exercise;Endurance training;Patient/family training;Equipment eval/education;Gait training;Compensatory technique education;Spoke to case management;Spoke to MD;OT;Spoke to nursing;Elevations;LE strengthening/ROM   PT Frequency 2-3x/wk   Discharge Recommendation   Rehab Resource Intensity Level, PT No post-acute rehabilitation needs   Additional Comments using RW vs. knee scooter (pt owns both)   AM-PAC Basic Mobility Inpatient   Turning in Flat Bed Without Bedrails 4   Lying on Back to Sitting on Edge of Flat Bed Without Bedrails 4   Moving Bed to Chair 3   Standing Up From Chair Using Arms 3   Walk in Room 3   Climb 3-5 Stairs With Railing 1   Basic Mobility Inpatient Raw Score 18   Basic Mobility Standardized Score 41.05   [facility] Highest Level Of Mobility  "  JH-HLM Goal 6: Walk 10 steps or more   JH-HLM Achieved 7: Walk 25 feet or more   Additional Treatment Session   Start Time 0917   End Time 0927   Treatment Assessment Pt tolerates tx session well.  PT introduces knee scooter for pt to trial.  Pt owns one and would like to use at home as long as space allows.  PT just reminds pt to take her time as she has somewhat of a tendency to rush with certain tasks.   Equipment Use Pt utilizes knee scooter for transfer and \"ambulation\" in hallway with SPV for about 175 ft.  PT takes time to educate pt on effective/efficient process of getting on/off the knee scooter.  She likes the knee platform in the lowest posible position.   End of Consult   Patient Position at End of Consult   (in bathroom with OT)     Pt requires skilled PT/OT co-eval due to low am-pac nursing score, unpredictability, and acute ortho surgery.    (Please find full objective findings from PT assessment regarding body systems outlined above).     Assessment: Pt is a 64 y.o. female seen for PT evaluation s/p admission to [facility] on 4/22/2025 with Closed displaced bimalleolar fracture of right lower leg.  Order placed for PT services.  Upon evaluation: Pt is presenting with impaired functional mobility due to pain, decreased strength, impaired balance, gait deviations, orthopedic restrictions, and fall risk requiring  SPV assistance for transfers and ambulation with RW . Pt's clinical presentation is currently evolving given the functional mobility deficits above, coupled with fall risks as indicated by AM-PAC 6-Clicks: 18/24 as well as hx of falls and obesity and combined with medical complications of abnormal H&H, abnormal CBC, and need for input for mobility technique/safety.  Personal factors affecting pt at time of IE include: inability to perform IADLs and recent fall(s)/fall history. Pt will benefit from continued skilled acute care PT services to address deficits as defined " above and to maximize level of functional mobility to facilitate return toward PLOF and improved QOL. From PT/mobility standpoint, recommendation at time of d/c would be No Post-Acute Rehabilitation Needs pending progress.    The patient's AM-PAC Basic Mobility Inpatient Short Form Raw Score is 18. A Raw score of greater than 16 suggests the patient may benefit from discharge to home. Please also refer to the recommendation of the Physical Therapist for safe discharge planning.       Goals: Pt will perform transfers with modified I to increase Indep in home environment and prepare for ambulation. Pt will ambulate with RW for >/= 50 ft with  modified I  to decrease risk for falls, demonstrate compliance with WB limitations, and promote proper use of assistive device and to access home environment.          Rehan Ray, PT,DPT

## 2025-04-23 NOTE — CASE MANAGEMENT
Case Management Assessment & Discharge Planning Note    Patient name Brooklyn Vazquez  Location /-01 MRN [id number]  : 1960 Date 2025       Current Admission Date: 2025  Current Admission Diagnosis:Closed displaced bimalleolar fracture of right lower leg   Patient Active Problem List    Diagnosis Date Noted Date Diagnosed    Palpitations 2025     Anemia 2025     Closed displaced bimalleolar fracture of right lower leg 2025     PONV (postoperative nausea and vomiting)      GERD (gastroesophageal reflux disease)      Obesity        LOS (days): 0  Geometric Mean LOS (GMLOS) (days):   Days to GMLOS:     OBJECTIVE:              Current admission status: Outpatient Surgery  Referral Reason: Information    Preferred Pharmacy:   [facility]/pharmacy [address] [place] [address]  Phone: [phone] Fax: [phone]    [facility]/pharmacy [address] [place] [address] [address] [address] [address] [address]  Phone: [phone] Fax: [phone]    Primary Care Provider: Abbi Irene MD    Primary Insurance: [facility] REP  Secondary Insurance:     ASSESSMENT:  Active Health Care Proxies    There are no active Health Care Proxies on file.       Advance Directives  Does patient have a Health Care POA?: No  Was patient offered paperwork?: Yes  Does patient currently have a Health Care decision maker?: Yes, please see Health Care Proxy section  Does patient have Advance Directives?: No  Was patient offered paperwork?: Yes  Primary Contact: Matt (Son)         Readmission Root Cause  30 Day Readmission: No    Patient Information  Admitted from:: Home  Mental Status: Alert  During Assessment patient was accompanied by: Not accompanied during assessment  Assessment information provided by:: Patient  Primary Caregiver: Self  Support Systems: Spouse/significant other, Children, Son, Friend, Family  members  [facility]: [facility]  What city do you live in?: [place]  Home entry access options. Select all that apply.: Ramp  Type of Current Residence: [facility]  Living Arrangements: Lives w/ Spouse/significant other  Is patient a ?: No    Activities of Daily Living Prior to Admission  Functional Status: Independent  Completes ADLs independently?: Yes  Ambulates independently?: Yes  Does patient use assisted devices?: No  Does patient currently own DME?: Yes  What DME does the patient currently own?: Wheelchair, Walker, Straight Cane, Bedside Commode, Shower Chair, Other (Comment) (cy)  Does patient have a history of Outpatient Therapy (PT/OT)?: Yes ([facility])  Does the patient have a history of Short-Term Rehab?: No  Does patient have a history of HHC?: No  Does patient currently have HHC?: No         Patient Information Continued  Income Source: SSI/SSD  Does patient have prescription coverage?: Yes  Can the patient afford their medications and any related supplies (such as glucometers or test strips)?: Yes  Does patient receive dialysis treatments?: No  Does patient have a history of substance abuse?: No  Does patient have a history of Mental Health Diagnosis?: No         Means of Transportation  Means of Transport to Appts:: Drives Self          DISCHARGE DETAILS:    Discharge planning discussed with:: patient  Freedom of Choice: Yes  Comments - Freedom of Choice: aware of OPPT when ready but does not think she needs it at this time  CM contacted family/caregiver?: No- see comments  Were Treatment Team discharge recommendations reviewed with patient/caregiver?: Yes  Did patient/caregiver verbalize understanding of patient care needs?: Yes  Were patient/caregiver advised of the risks associated with not following Treatment Team discharge recommendations?: Yes         Requested Home Health Care         Is the patient interested in HHC at discharge?: No    DME Referral  Provided  Referral made for DME?: No    Other Referral/Resources/Interventions Provided:  Interventions: None Indicated    Would you like to participate in our [facility] service program?  : No - Declined    Treatment Team Recommendation: Home, Outpatient Rehab  Discharge Destination Plan:: Home, Outpatient Rehab  Transport at Discharge : Family          CM met with patient at the bedside,baseline information  was obtained. CM discussed the role of CM in helping the patient develop a discharge plan and assist the patient in carry out their plan.  Patient was IPTA, uses no DME at baseline for ambulation, and has no hx of rehab/therapy. Home is a ranch home with a ramp.   Patient drives at baseline.   Patient had hx of OPPT. Patient has all DME needed at home from her spouse's medical issues.   CM reviewed CM consult for OP resources - she denied anything she needs assistance with at this time.   CM discussed discharge planning - at this time patient has no anticipated CM needs.     CM to follow patient's care and discharge needs.

## 2025-04-23 NOTE — PLAN OF CARE
Problem: PAIN - ADULT  Goal: Verbalizes/displays adequate comfort level or baseline comfort level  Description: Interventions:- Encourage patient to monitor pain and request assistance- Assess pain using appropriate pain scale- Administer analgesics based on type and severity of pain and evaluate response- Implement non-pharmacological measures as appropriate and evaluate response- Consider cultural and social influences on pain and pain management- Notify physician/advanced practitioner if interventions unsuccessful or patient reports new pain  Outcome: Progressing     Problem: SAFETY ADULT  Goal: Patient will remain free of falls  Description: INTERVENTIONS:- Educate patient/family on patient safety including physical limitations- Instruct patient to call for assistance with activity - Consult OT/PT to assist with strengthening/mobility - Keep Call bell within reach- Keep bed low and locked with side rails adjusted as appropriate- Keep care items and personal belongings within reach- Initiate and maintain comfort rounds- Make Fall Risk Sign visible to staff- Offer Toileting every 2 Hours, in advance of need- Initiate/Maintain bed alarm- Obtain necessary fall risk management equipment: non skid socks- Apply yellow socks and bracelet for high fall risk patients- Consider moving patient to room near nurses station  Outcome: Progressing  Goal: Maintain or return to baseline ADL function  Description: INTERVENTIONS:-  Assess patient's ability to carry out ADLs; assess patient's baseline for ADL function and identify physical deficits which impact ability to perform ADLs (bathing, care of mouth/teeth, toileting, grooming, dressing, etc.)- Assess/evaluate cause of self-care deficits - Assess range of motion- Assess patient's mobility; develop plan if impaired- Assess patient's need for assistive devices and provide as appropriate- Encourage maximum independence but intervene and supervise when necessary- Involve family  in performance of ADLs- Assess for home care needs following discharge - Consider OT consult to assist with ADL evaluation and planning for discharge- Provide patient education as appropriate  Outcome: Progressing  Goal: Maintains/Returns to pre admission functional level  Description: INTERVENTIONS:- Perform AM-PAC 6 Click Basic Mobility/ Daily Activity assessment daily.- Set and communicate daily mobility goal to care team and patient/family/caregiver. - Collaborate with rehabilitation services on mobility goals if consulted- Perform Range of Motion 3 times a day.- Reposition patient every 2 hours.- Dangle patient 3 times a day- Stand patient 3 times a day- Ambulate patient 3 times a day- Out of bed to chair 3 times a day - Out of bed for meals 3 times a day- Out of bed for toileting- Record patient progress and toleration of activity level   Outcome: Progressing     Problem: DISCHARGE PLANNING  Goal: Discharge to home or other facility with appropriate resources  Description: INTERVENTIONS:- Identify barriers to discharge w/patient and caregiver- Arrange for needed discharge resources and transportation as appropriate- Identify discharge learning needs (meds, wound care, etc.)- Arrange for interpretive services to assist at discharge as needed- Refer to Case Management Department for coordinating discharge planning if the patient needs post-hospital services based on physician/advanced practitioner order or complex needs related to functional status, cognitive ability, or social support system  Outcome: Progressing     Problem: Knowledge Deficit  Goal: Patient/family/caregiver demonstrates understanding of disease process, treatment plan, medications, and discharge instructions  Description: Complete learning assessment and assess knowledge base.Interventions:- Provide teaching at level of understanding- Provide teaching via preferred learning methods  Outcome: Progressing     Problem: Prexisting or High  Potential for Compromised Skin Integrity  Goal: Skin integrity is maintained or improved  Description: INTERVENTIONS:- Identify patients at risk for skin breakdown- Assess and monitor skin integrity- Assess and monitor nutrition and hydration status- Monitor labs - Assess for incontinence - Turn and reposition patient- Assist with mobility/ambulation- Relieve pressure over bony prominences- Avoid friction and shearing- Provide appropriate hygiene as needed including keeping skin clean and dry- Evaluate need for skin moisturizer/barrier cream- Collaborate with interdisciplinary team - Patient/family teaching- Consider wound care consult   Outcome: Progressing

## 2025-04-23 NOTE — OCCUPATIONAL THERAPY NOTE
Occupational Therapy Evaluation     Patient Name: Brooklyn Vazquez  Today's Date: 4/23/2025  Problem List  Principal Problem:    Closed displaced bimalleolar fracture of right lower leg  Active Problems:    PONV (postoperative nausea and vomiting)    Past Medical History  Past Medical History:   Diagnosis Date    GERD (gastroesophageal reflux disease)     Known health problems: none     PONV (postoperative nausea and vomiting)      Past Surgical History  Past Surgical History:   Procedure Laterality Date    APPENDECTOMY      FRACTURE SURGERY Right     arm    HYSTERECTOMY      MENISCECTOMY      ORIF TIBIA & FIBULA FRACTURES Right 4/22/2025    Procedure: OPEN REDUCTION W/ INTERNAL FIXATION (ORIF) ANKLE;  Surgeon: Brian Au DO;  Location: [facility];  Service: Orthopedics        04/23/25 0855   Note Type   Note type Evaluation   Pain Assessment   Pain Assessment Tool 0-10   Pain Score 6   Pain Location/Orientation Orientation: Right;Orientation: Lower;Location: Leg   Restrictions/Precautions   Weight Bearing Precautions Per Order Yes   RLE Weight Bearing Per Order NWB   Other Precautions Chair Alarm;Bed Alarm;WBS;Fall Risk;Pain   Home Living   Type of Home House   Home Layout One level;Ramped entrance   Bathroom Shower/Tub Walk-in shower   Bathroom Toilet Standard   Bathroom Equipment Tub transfer bench;Grab bars around toilet;Commode   Home Equipment Crutches;Wheelchair-manual;Walker;Other (Comment)  (Knee scooter)   Prior Function   Level of [place] Independent with ADLs;Independent with functional mobility;Independent with IADLS   Lives With Spouse   Receives Help From Family  (Son, sister in law)   IADLs Independent with driving;Independent with meal prep;Independent with medication management  (Cares for her mother, who has dementia)   Falls in the last 6 months 1 to 4   Vocational   (Healthcare aide, milked cows, 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   Subjective   Subjective \"My ankle was twisted so I just moved it back into place\"   ADL   UB Dressing Assistance 7  Independent   LB Dressing Assistance 5  Supervision/Setup  (Minimal assistance for safe dynamic standing balance while pulling up over hips in standing, s/p minor LOB noted)   LB Dressing Deficit Don/doff L sock;Thread RLE into pants;Thread LLE into pants;Thread RLE into underwear;Thread LLE into underwear;Pull up over hips   Toileting Assistance  5  Supervision/Setup   Toileting Deficit Setup;Steadying;Verbal cueing;Supervison/safety;Increased time to complete;Perineal hygiene   Additional Comments Pt noted to be adjusting her sock while long sitting in bed without difficulty. Pt participated in pericare after toileting in standing position with supervision. Pt donned underwear and pants, threading through BLEs in seated with supervision and pulling up over hips in standing with close supervision/intermittent minimal assistance and pt utilizing RW for support.   Bed Mobility   Supine to Sit 6  Modified independent   Additional items HOB elevated   Transfers   Sit to Stand 5  Supervision   Stand to Sit 5  Supervision   Additional items Verbal cues   Stand pivot 5  Supervision   Additional items Verbal cues   Toilet transfer 5  Supervision   Additional items Increased time required;Verbal cues;Raised toilet seat  (Grab bars)   Additional Comments Using RW. Pt able to hop on the LLE to comply with NWB status of the RLE.   Functional Mobility   Functional Mobility 5  Supervision   Additional Comments Pt participated in short functional distance in room, hopping to and from bathroom with use of RW and supervision.   Additional items Rolling walker   Balance   Static Sitting Normal   Dynamic Sitting Good   Static Standing Fair +   Dynamic Standing Fair   Ambulatory Fair +  (RW)   Activity Tolerance   Activity Tolerance Patient tolerated treatment well   Medical " Staff Made Aware PT, Rehan   Nurse Made Aware RNAmalia Assessment   RUE Assessment WFL   LUE Assessment   LUE Assessment WFL   Hand Function   Gross Motor Coordination Functional   Fine Motor Coordination Functional   Hand Function Comments Pt is L hand dominant.   Sensation   Light Touch No apparent deficits   Vision-Basic Assessment   Current Vision Wears glasses only for reading   Cognition   Overall Cognitive Status WFL   Arousal/Participation Alert;Cooperative   Attention Within functional limits   Orientation Level Oriented X4   Memory Within functional limits   Following Commands Follows all commands and directions without difficulty   Assessment   Limitation Decreased ADL status;Decreased Safe judgement during ADL;Decreased self-care trans;Decreased high-level ADLs   Prognosis Good   Assessment Pt is a 64 y.o. female, admitted to [facility]22/[facility]25 d/t experiencing closed displaced bimalleolar fracture of R ankle. Pt s/p R ankle ORIF POD #1, NWB RLE. Pt with PMHx impacting their performance during ADL tasks, including: GERD, PONV. Prior to admission to the hospital Pt was performing ADLs without physical assistance. IADLs without physical assistance. Functional transfers/ambulation without physical assistance. Cognitive status PTA was WFL. OT order placed to assess Pt's ADLs, cognitive status, and performance during functional tasks in order to maximize safety and independence while making most appropriate d/c recommendations. PT/OT co-evaluation completed at this time d/t initial [facility] mobility score of 14, new NWB status of RLE and unpredictability. Pt's clinical presentation is currently evolving given new onset deficits that affect Pt's occupational performance and ability to safely return to PLOF including decrease standing balance, decrease performance during ADL tasks, decrease safety awareness , increased pain, decrease performance during functional transfers, limited family support, and high  fall risk combined with medical complications of pain impacting overall mobility status, edema/swelling, wounds, decreased skin integrity, and need for input for mobility technique/safety. Personal factors affecting Pt at time of initial evaluation include: unable to perform caregiver support/tasks, limited home support, inability to perform IADLs, new need for AD, inability to navigate community distances, decreased initiation and engagement, and recent fall(s)/fall history. Pt will benefit from continued skilled acute OT services to address deficits as defined above and to maximize level independence/participation during ADLs and functional tasks to facilitate return toward PLOF and improved quality of life. Pt is anticipated to continue to progress well towards goals of care. From an occupational therapy standpoint, No Post-Acute Rehabilitation Needs are recommended upon d/c.   Goals   Patient Goals to go home   Plan   Treatment Interventions ADL retraining;Functional transfer training;UE strengthening/ROM;Patient/family training;Equipment evaluation/education;Compensatory technique education;Continued evaluation;Energy conservation;Activityengagement   Goal Expiration Date 05/07/25   OT Treatment Day 1   OT Frequency 1-2x/wk   Discharge Recommendation   Rehab Resource Intensity Level, OT No post-acute rehabilitation needs   Additional Comments  Use of RW vs knee scooter to continue adherence to NWB status of RLE. Use of tub transfer bench for time being when cleared to shower, place BSC frame over top of standard toilet (pt already has all of this equipment at home)   AM-PAC Daily Activity Inpatient   Lower Body Dressing 3   Bathing 3   Toileting 3   Upper Body Dressing 4   Grooming 4   Eating 4   Daily Activity Raw Score 21   Daily Activity Standardized Score (Calc for Raw Score >=11) 44.27   AM-PAC Applied Cognition Inpatient   Following a Speech/Presentation 4   Understanding Ordinary Conversation 4   Taking  Medications 4   Remembering Where Things Are Placed or Put Away 4   Remembering List of 4-5 Errands 4   Taking Care of Complicated Tasks 4   Applied Cognition Raw Score 24   Applied Cognition Standardized Score 62.21   Additional Treatment Session   Start Time 0928   End Time 0945   Treatment Assessment Treatment interventions focused on ADL tasks to simulate morning routine. With utilization of knee scooter, pt participated in short functional distance to bathroom with supervision. While at sink, pt brushing her teeth/partial dentures, rinsing her mouth, combing/styling her hair, washing her face provided setup/supervision. Discussed utilizing BSC frame to place over top of her standard toilet at home for a higher surface to transfer to/from and maximizing safety/stability, pt appreciative of this intervention and verbalizes understanding. Took some time to briefly explain how to safely maneuver around the home with use of knee scooter should she choose to utilize that. Later resumed use of RW and hopping on the LLE which pt demonstrates ability to complete functional mobility and transfers at supervision level. NWB status maintained and adhered to throughout entire tx.   End of Consult   Patient Position at End of Consult Bedside chair;Bed/Chair alarm activated;All needs within reach     The patient's raw score on the AM-PAC Daily Activity Inpatient Short Form is 21. A raw score of greater than or equal to 19 suggests the patient may benefit from discharge to home. Please refer to the recommendation of the Occupational Therapist for safe discharge planning.    Pt goals to be met by 5/7/2025:    Pt will demonstrate ability to complete grooming/hygiene tasks @ I after set-up.  Pt will demonstrate ability to complete UB ADLs including washing/dressing @ I in order to increase performance and participation during meaningful tasks  Pt will demonstrate ability to complete LB dressing @ I in order to increase safety and  independence during meaningful tasks.   Pt will demonstrate ability to ernesto/doff socks/shoes while sitting EOB/chair @ I in order to increase safety and independence during meaningful tasks.   Pt will demonstrate ability to complete toileting tasks including CM and pericare @ I in order to increase safety and independence during meaningful tasks.  Pt will demonstrate ability to complete EOB, chair, toilet/commode transfers @ I in order to increase performance and participation during functional tasks.  Pt will demonstrate ability to stand for 20 minutes while maintaining F+ balance with use of RW for UB support PRN, with continued adherence to NWB status of RLE.  Pt will demonstrate ability to tolerate 35 minute OT session with no vc'ing for deep breathing or use of energy conservation techniques in order to increase activity tolerance during functional tasks.   Pt will demonstrate Good carryover of use of energy conservation/compensatory strategies during ADLs and functional tasks in order to increase safety and reduce risk for falls.   Pt will demonstrate Good attention and participation in continued evaluation of functional ambulation house hold distances in order to assist with safe d/c planning.  Pt will attend to continued cognitive assessments 100% of the time in order to provide most appropriate d/c recommendations.   Pt will follow 100% simple 2-step commands and be A&O x4 consistently with environmental cues to increase participation in functional activities.   Pt will identify 3 areas of interest/hobbies and 1 intervention on how to incorporate into daily life in order to increase interaction with environment and peers as well as increase participation in meaningful tasks.   Pt will demonstrate 100% carryover of BUE HEP in order to increase BUE MS and increase performance during functional tasks upon d/c home.    Madhu King MS, OTR/L

## 2025-04-23 NOTE — ASSESSMENT & PLAN NOTE
9.7 on am labs   No labs prior for trend   Denies history of iron deficiency   Suspect ABLA in the setting of recent procedure   No overt signs/symptoms of bleeding at this time   Recommend repeat labs in 1 week of discharge  Labs discussed with patient at bedside

## 2025-04-23 NOTE — ASSESSMENT & PLAN NOTE
Patient had fall at home on 4/20 from a stepstool which resulted in right bimalleolar fracture.    S/p OPEN REDUCTION W/ INTERNAL FIXATION (ORIF) ANKLE 4/22 by Dr. Au   Pain control   PT/OT eval -- no post acute rehab needs  Stable for discharge with follow up scheduled 4/30

## 2025-04-23 NOTE — PROGRESS NOTES
Progress Note - Hospitalist   Name: Brooklyn Vazquez 64 y.o. female I MRN: [id number]  Unit/Bed#: -01 I Date of Admission: 4/22/2025   Date of Service: 4/23/2025 I Hospital Day: 0    Assessment & Plan  Closed displaced bimalleolar fracture of right lower leg  Patient had fall at home on 4/20 from a stepstool which resulted in right bimalleolar fracture.    S/p OPEN REDUCTION W/ INTERNAL FIXATION (ORIF) ANKLE 4/22 by Dr. Au   Pain control   PT/OT eval -- no post acute rehab needs  Stable for discharge with follow up scheduled 4/30  PONV (postoperative nausea and vomiting)  She had some nausea controlled with zofran  No further episodes   Palpitations  Patient does admit to occasional palpitations   Non currently   Recommending outpatient follow up with PCP on discharge  Anemia  9.7 on am labs   No labs prior for trend   Denies history of iron deficiency   Suspect ABLA in the setting of recent procedure   No overt signs/symptoms of bleeding at this time   Recommend repeat labs in 1 week of discharge  Labs discussed with patient at bedside     VTE Pharmacologic Prophylaxis:   Moderate Risk (Score 3-4) - Pharmacological DVT Prophylaxis Ordered: enoxaparin (Lovenox).    Mobility:   Basic Mobility Inpatient Raw Score: 14  JH-HLM Goal: 4: Move to chair/commode  JH-HLM Achieved: 2: Bed activities/Dependent transfer  JH-HLM Goal NOT achieved. Continue with multidisciplinary rounding and encourage appropriate mobility to improve upon JH-HLM goals.    Patient Centered Rounds: I performed bedside rounds with nursing staff today.   Discussions with Specialists or Other Care Team Provider: None     Education and Discussions with Family / Patient:  per primary.     Current Length of Stay: 0 day(s)  Current Patient Status: Outpatient Surgery   Certification Statement: The patient will continue to require additional inpatient hospital stay due to closed displaced bimalleolar fracture of R lower extremity, safe  dispo  Discharge Plan: [facility] is following this patient on consult. They are medically stable for discharge when deemed appropriate by primary service.    Code Status: Level 1 - Full Code    Subjective   Seen and examined.  No acute events overnight.  Patient reports feeling well.  She is eager for discharge.  She denies chest pain, shortness of breath, nausea, vomiting, diarrhea, lightheadedness or dizziness. Reports she always gets nauseous with anesthesia however was given zofran with improvement in symptoms yesterday. No further episodes of nausea since.    Objective :  Temp:  [97 °F (36.1 °C)-97.9 °F (36.6 °C)] 97.9 °F (36.6 °C)  HR:  [] 90  BP: ()/(50-72) 113/66  Resp:  [16-18] 18  SpO2:  [90 %-100 %] 96 %  O2 Device: None (Room air)  Nasal Cannula O2 Flow Rate (L/min):  [2 L/min] 2 L/min    Body mass index is 34.62 kg/m².     Input and Output Summary (last 24 hours):     Intake/Output Summary (Last 24 hours) at 4/23/2025 0838  Last data filed at 4/23/2025 0520  Gross per 24 hour   Intake 950 ml   Output 1005 ml   Net -55 ml       Physical Exam  Constitutional:       General: She is not in acute distress.     Appearance: She is not ill-appearing.   HENT:      Head: Normocephalic and atraumatic.      Nose: Nose normal.      Mouth/Throat:      Mouth: Mucous membranes are moist.   Eyes:      Conjunctiva/sclera: Conjunctivae normal.   Cardiovascular:      Rate and Rhythm: Normal rate.      Heart sounds: Normal heart sounds. No murmur heard.     No friction rub. No gallop.   Pulmonary:      Effort: Pulmonary effort is normal.      Breath sounds: No wheezing, rhonchi or rales.   Abdominal:      Palpations: Abdomen is soft.   Musculoskeletal:         General: Swelling and signs of injury (RLE in appropraite ace bandage) present.      Cervical back: Normal range of motion.   Skin:     General: Skin is warm.   Neurological:      General: No focal deficit present.      Mental Status: She is alert.    Psychiatric:         Mood and Affect: Mood normal.       Lines/Drains:      Lab Results: I have reviewed the following results:       Results from last 7 days   Lab Units 04/23/25  0516   SODIUM mmol/L 141   POTASSIUM mmol/L 3.8   CHLORIDE mmol/L 109*   CO2 mmol/L 26   BUN mg/dL 15   CREATININE mg/dL 0.90   ANION GAP mmol/L 6   CALCIUM mg/dL 8.5   GLUCOSE RANDOM mg/dL 96                       Recent Cultures (last 7 days):         Imaging Results Review: I reviewed radiology reports from this admission including: xray(s).  Other Study Results Review: No additional pertinent studies reviewed.    Last 24 Hours Medication List:     Current Facility-Administered Medications:     acetaminophen (Ofirmev) injection 1,000 mg, Q12H PRN    acetaminophen (TYLENOL) tablet 650 mg, Q8H    docusate sodium (COLACE) capsule 100 mg, BID    enoxaparin (LOVENOX) subcutaneous injection 40 mg, Daily    fluticasone (FLONASE) 50 mcg/act nasal spray 2 spray, Daily    ibuprofen (MOTRIN) tablet 600 mg, Q6H PRN    lactated ringers bolus 1,000 mL, Once PRN **AND** lactated ringers bolus 1,000 mL, Once PRN    morphine injection 2 mg, Q2H PRN    ondansetron (ZOFRAN) injection 4 mg, Q6H PRN    oxyCODONE (ROXICODONE) IR tablet 5 mg, Q4H PRN    sodium chloride 0.9 % bolus 1,000 mL, Once PRN **AND** sodium chloride 0.9 % bolus 1,000 mL, Once PRN    Administrative Statements   Today, Patient Was Seen By: Leigh Perez PA-C    **Please Note: This note may have been constructed using a voice recognition system.**

## 2025-04-23 NOTE — ASSESSMENT & PLAN NOTE
Patient does admit to occasional palpitations   Non currently   Recommending outpatient follow up with PCP on discharge

## 2025-04-23 NOTE — PLAN OF CARE
Problem: PAIN - ADULT  Goal: Verbalizes/displays adequate comfort level or baseline comfort level  Description: Interventions:- Encourage patient to monitor pain and request assistance- Assess pain using appropriate pain scale- Administer analgesics based on type and severity of pain and evaluate response- Implement non-pharmacological measures as appropriate and evaluate response- Consider cultural and social influences on pain and pain management- Notify physician/advanced practitioner if interventions unsuccessful or patient reports new pain  Outcome: Progressing     Problem: SAFETY ADULT  Goal: Patient will remain free of falls  Description: INTERVENTIONS:- Educate patient/family on patient safety including physical limitations- Instruct patient to call for assistance with activity - Consult OT/PT to assist with strengthening/mobility - Keep Call bell within reach- Keep bed low and locked with side rails adjusted as appropriate- Keep care items and personal belongings within reach- Initiate and maintain comfort rounds-   Outcome: Progressing  Goal: Maintain or return to baseline ADL function  Description: INTERVENTIONS:-  Assess patient's ability to carry out ADLs; assess patient's baseline for ADL function and identify physical deficits which impact ability to perform ADLs (bathing, care of mouth/teeth, toileting, grooming, dressing, etc.)- Assess/evaluate cause of self-care deficits - Assess range of motion- Assess patient's mobility; develop plan if impaired- Assess patient's need for assistive devices and provide as appropriate- Encourage maximum independence but intervene and supervise when necessary- Involve family in performance of ADLs- Assess for home care needs following discharge - Consider OT consult to assist with ADL evaluation and planning for discharge- Provide patient education as appropriate  Outcome: Progressing  Goal: Maintains/Returns to pre admission functional level  Description:  INTERVENTIONS:- Perform AM-PAC 6 Click Basic Mobility/ Daily Activity assessment daily.- Set and communicate daily mobility goal to care team and patient/family/caregiver. - Collaborate with rehabilitation services on mobility goals if consulted-  Outcome: Progressing     Problem: DISCHARGE PLANNING  Goal: Discharge to home or other facility with appropriate resources  Description: INTERVENTIONS:- Identify barriers to discharge w/patient and caregiver- Arrange for needed discharge resources and transportation as appropriate- Identify discharge learning needs (meds, wound care, etc.)- Arrange for interpretive services to assist at discharge as needed- Refer to Case Management Department for coordinating discharge planning if the patient needs post-hospital services based on physician/advanced practitioner order or complex needs related to functional status, cognitive ability, or social support system  Outcome: Progressing     Problem: Knowledge Deficit  Goal: Patient/family/caregiver demonstrates understanding of disease process, treatment plan, medications, and discharge instructions  Description: Complete learning assessment and assess knowledge base.Interventions:- Provide teaching at level of understanding- Provide teaching via preferred learning methods  Outcome: Progressing     Problem: Prexisting or High Potential for Compromised Skin Integrity  Goal: Skin integrity is maintained or improved  Description: INTERVENTIONS:- Identify patients at risk for skin breakdown- Assess and monitor skin integrity- Assess and monitor nutrition and hydration status- Monitor labs - Assess for incontinence - Turn and reposition patient- Assist with mobility/ambulation- Relieve pressure over bony prominences- Avoid friction and shearing- Provide appropriate hygiene as needed including keeping skin clean and dry- Evaluate need for skin moisturizer/barrier cream- Collaborate with interdisciplinary team - Patient/family teaching-  Consider wound care consult   Outcome: Progressing

## 2025-04-23 NOTE — ASSESSMENT & PLAN NOTE
POD #1 ORIF right ankle bimalleolar fracture.  Nonweightbearing ambulation with walker, kneeling walker or crutches as indicated.  PT/OT evaluation awaiting.  Anticipate discharge today assuming clearance by PT.  Follow-up already scheduled in 1 week.

## 2025-04-30 ENCOUNTER — OFFICE VISIT (OUTPATIENT)
Dept: OBGYN CLINIC | Facility: CLINIC | Age: 65
End: 2025-04-30

## 2025-04-30 VITALS — WEIGHT: 177 LBS | BODY MASS INDEX: 34.75 KG/M2 | HEIGHT: 60 IN

## 2025-04-30 DIAGNOSIS — S82.841D CLOSED DISPLACED BIMALLEOLAR FRACTURE OF RIGHT ANKLE WITH ROUTINE HEALING, SUBSEQUENT ENCOUNTER: Primary | ICD-10-CM

## 2025-04-30 PROBLEM — S82.841A: Status: ACTIVE | Noted: 2025-04-30

## 2025-04-30 PROCEDURE — 99024 POSTOP FOLLOW-UP VISIT: CPT | Performed by: ORTHOPAEDIC SURGERY

## 2025-04-30 NOTE — PROGRESS NOTES
Patient Name:  Brooklyn Vazquez  MRN:  [id number]    Assessment & Plan  Closed displaced bimalleolar fracture of right ankle with routine healing, subsequent encounter  I would recommend follow-up in 1 week.  Her stitches will be removed.  In the interim, she is to remain in the cam boot except for periods of inactivity and cleansing with precautions as discussed.  X-rays will be obtained at follow-up including AP, lateral and mortise views of her ankle.  She is to contact the office if questions or concerns arise.  Orders:    Cam Boot      Return in about 1 week (around 5/7/2025).      Subjective   Brooklyn Vazquez returns for follow-up of her right ankle fracture.  The patient is 8 day(s) post op and returns for routine follow-up. Patient denies any significant pain.  In fact, she states she has only used Tylenol for pain postoperatively.  She denies any systemic symptoms.  She denies any paresthesias.      Objective     Ht 5' (1.524 m)   Wt 80.3 kg (177 lb)   BMI 34.57 kg/m²     Exam demonstrates the postop dressings in place.  These were removed without difficulty.  Both incisions are benign with stitches in place.  She does have moderate swelling consistent with her early postoperative course.  Distal sensation is intact.  Good color and capillary refill is noted she can move her toes.  She has limited ankle range of motion consistent with her early postoperative course.        Brian Au DO

## 2025-04-30 NOTE — ASSESSMENT & PLAN NOTE
I would recommend follow-up in 1 week.  Her stitches will be removed.  In the interim, she is to remain in the cam boot except for periods of inactivity and cleansing with precautions as discussed.  X-rays will be obtained at follow-up including AP, lateral and mortise views of her ankle.  She is to contact the office if questions or concerns arise.  Orders:    Cam Boot

## 2025-05-07 ENCOUNTER — OFFICE VISIT (OUTPATIENT)
Dept: OBGYN CLINIC | Facility: CLINIC | Age: 65
End: 2025-05-07

## 2025-05-07 ENCOUNTER — HOSPITAL ENCOUNTER (OUTPATIENT)
Dept: RADIOLOGY | Facility: CLINIC | Age: 65
Discharge: HOME | End: 2025-05-07
Attending: ORTHOPAEDIC SURGERY
Payer: COMMERCIAL

## 2025-05-07 VITALS — BODY MASS INDEX: 34.75 KG/M2 | WEIGHT: 177 LBS | HEIGHT: 60 IN

## 2025-05-07 DIAGNOSIS — S82.841D CLOSED DISPLACED BIMALLEOLAR FRACTURE OF RIGHT ANKLE WITH ROUTINE HEALING, SUBSEQUENT ENCOUNTER: Primary | ICD-10-CM

## 2025-05-07 DIAGNOSIS — L03.115 CELLULITIS OF RIGHT ANKLE: ICD-10-CM

## 2025-05-07 DIAGNOSIS — S82.841D CLOSED DISPLACED BIMALLEOLAR FRACTURE OF RIGHT ANKLE WITH ROUTINE HEALING, SUBSEQUENT ENCOUNTER: ICD-10-CM

## 2025-05-07 PROCEDURE — 73610 X-RAY EXAM OF ANKLE: CPT

## 2025-05-07 PROCEDURE — 99024 POSTOP FOLLOW-UP VISIT: CPT | Performed by: ORTHOPAEDIC SURGERY

## 2025-05-07 RX ORDER — CEPHALEXIN 500 MG/1
500 CAPSULE ORAL EVERY 6 HOURS SCHEDULED
Qty: 28 CAPSULE | Refills: 0 | Status: SHIPPED | OUTPATIENT
Start: 2025-05-07 | End: 2025-05-14

## 2025-05-07 NOTE — ASSESSMENT & PLAN NOTE
Patient was started on Keflex 500 mg 4 times a day for a week.  Will see her back in 7 to 9 days for quick wound check.  Steri-Strips were applied.  Steri-Strips remain in place.  She cannot get this area wet but no soaking or submerging in water.  Continue nonweightbearing.

## 2025-05-07 NOTE — PROGRESS NOTES
Name: Brooklyn Vazquez      : 1960      MRN: [id number]  Encounter Provider: Brian Au DO  Encounter Date: 2025   Encounter department: [facility] [place]  :  Assessment & Plan  Closed displaced bimalleolar fracture of right ankle with routine healing, subsequent encounter    Patient was started on Keflex 500 mg 4 times a day for a week.  Will see her back in 7 to 9 days for quick wound check.  Steri-Strips were applied.  Steri-Strips remain in place.  She cannot get this area wet but no soaking or submerging in water.  Continue nonweightbearing.        History of Present Illness   HPI  Brooklyn Vazquez is a 64 y.o. female who presents for routine follow-up right ankle bimalleolar ORIF 2025, 2 weeks ago.  Notes pain along the lateral shin.  Denies any numbness or tingling.  She is not diabetic.  He has switched to her on previous cam boot as the one she received was feeling too tight on her ankle.  She denies calf pain chest pain or shortness of breath.    Review of Systems  Current Outpatient Medications on File Prior to Visit   Medication Sig Dispense Refill    acetaminophen (TYLENOL) 325 mg tablet Take 650 mg by mouth every 6 (six) hours as needed for mild pain      aspirin 325 mg tablet Take 1 tablet (325 mg total) by mouth daily 30 tablet 0    fluticasone (FLONASE) 50 mcg/act nasal spray 2 sprays into each nostril daily 16 g 0    ondansetron (ZOFRAN-ODT) 4 mg disintegrating tablet Take 1 tablet (4 mg total) by mouth every 8 (eight) hours as needed for nausea or vomiting (Patient not taking: Reported on 2025) 20 tablet 0    oxyCODONE (Roxicodone) 5 immediate release tablet Take 1 tablet (5 mg total) by mouth every 6 (six) hours as needed for severe pain for up to 15 doses Max Daily Amount: 20 mg (Patient not taking: Reported on 2025) 15 tablet 0     No current facility-administered medications on file prior to visit.      Social History     Tobacco Use     Smoking status: Never     Passive exposure: Never    Smokeless tobacco: Never   Vaping Use    Vaping status: Never Used   Substance and Sexual Activity    Alcohol use: Not Currently     Comment: occasional    Drug use: No    Sexual activity: Not on file     Comment: defer      Objective   Ht 5' (1.524 m)   Wt 80.3 kg (177 lb)   BMI 34.57 kg/m²      Physical Exam    Right ankle notes adequate healing of both the medial and lateral incisions.  There is faint very superficial erythema along the proximal edge of the lateral incision and the distal medial incision.  No active discharge.  There is swelling in the area.  Area was painted with Betadine swab and Steri-Strips were applied for secondary reinforcement.  Patient has approximately 5 to 10 degrees plantarflexion dorsiflexion without pain.  She has full sensation to the tips of her toes x 5.  There is no calf pain no palpable cords negative Homans' sign.  She is faint tenderness to palpation over the proximal fibular area.    I personally reviewed x-rays of the right ankle taken in the office today which document Taine position alignment of the lateral plate and screws in the medial screws with mortise maintained fracture position unchanged from intraoperative x-rays.

## 2025-05-14 ENCOUNTER — OFFICE VISIT (OUTPATIENT)
Dept: OBGYN CLINIC | Facility: CLINIC | Age: 65
End: 2025-05-14

## 2025-05-14 VITALS — BODY MASS INDEX: 34.75 KG/M2 | HEIGHT: 60 IN | WEIGHT: 177 LBS

## 2025-05-14 DIAGNOSIS — S82.841D CLOSED DISPLACED BIMALLEOLAR FRACTURE OF RIGHT ANKLE WITH ROUTINE HEALING, SUBSEQUENT ENCOUNTER: Primary | ICD-10-CM

## 2025-05-14 PROCEDURE — 99024 POSTOP FOLLOW-UP VISIT: CPT | Performed by: ORTHOPAEDIC SURGERY

## 2025-05-14 NOTE — PROGRESS NOTES
Patient Name:  Brooklyn Vazquez  MRN:  [id number]    Assessment & Plan  Closed displaced bimalleolar fracture of right ankle with routine healing, subsequent encounter  I would recommend follow-up in 3 weeks.  X-rays of the ankle will be obtained including AP, lateral and mortise views with her boot removed.  She may remove the boot for periods of inactivity but should continue wearing the boot when active.  She may cleanse the incisions gently with soap and water.  I would recommend she not submerge the incisions.  She will contact me if any questions or concerns arise prior to follow-up.         Return in about 3 weeks (around 6/4/2025).      Subjective   Brooklyn Vazquez returns for follow-up of her right ankle fracture.  The patient is 3 week(s) post op and returns for routine follow-up. Patient notes improvement in the ankle.  She is finishing up her course of antibiotics.  She continues to remain compliant with nonweightbearing and using the cam boot.  She did question whether or not there would be something more convenient to use.      Objective     Ht 5' (1.524 m)   Wt 80.3 kg (177 lb)   BMI 34.57 kg/m²     Farrukh demonstrated the boot in place upon arrival.  This was removed without difficulty.  The incisions are benign and well-healed.  Steri-Strips were removed.  There is minor swelling but not inconsistent with the early postoperative course.  Distal sensation is intact.  Pulses are palpable.  Good color and capillary refill is noted.        Brian Au DO

## 2025-05-14 NOTE — ASSESSMENT & PLAN NOTE
I would recommend follow-up in 3 weeks.  X-rays of the ankle will be obtained including AP, lateral and mortise views with her boot removed.  She may remove the boot for periods of inactivity but should continue wearing the boot when active.  She may cleanse the incisions gently with soap and water.  I would recommend she not submerge the incisions.  She will contact me if any questions or concerns arise prior to follow-up.

## 2025-06-05 ENCOUNTER — HOSPITAL ENCOUNTER (OUTPATIENT)
Dept: RADIOLOGY | Facility: CLINIC | Age: 65
End: 2025-06-05
Attending: PHYSICIAN ASSISTANT
Payer: COMMERCIAL

## 2025-06-05 ENCOUNTER — OFFICE VISIT (OUTPATIENT)
Dept: OBGYN CLINIC | Facility: CLINIC | Age: 65
End: 2025-06-05

## 2025-06-05 VITALS — HEIGHT: 60 IN | WEIGHT: 177 LBS | BODY MASS INDEX: 34.75 KG/M2

## 2025-06-05 DIAGNOSIS — S82.841D CLOSED DISPLACED BIMALLEOLAR FRACTURE OF RIGHT ANKLE WITH ROUTINE HEALING, SUBSEQUENT ENCOUNTER: Primary | ICD-10-CM

## 2025-06-05 DIAGNOSIS — S82.841D CLOSED DISPLACED BIMALLEOLAR FRACTURE OF RIGHT ANKLE WITH ROUTINE HEALING, SUBSEQUENT ENCOUNTER: ICD-10-CM

## 2025-06-05 PROCEDURE — 99024 POSTOP FOLLOW-UP VISIT: CPT | Performed by: PHYSICIAN ASSISTANT

## 2025-06-05 PROCEDURE — 73610 X-RAY EXAM OF ANKLE: CPT

## 2025-06-05 NOTE — ASSESSMENT & PLAN NOTE
Patient was fitted with a new cam boot and will return to her previous cam boot.  She may start 50% partial weightbearing for the next 10 to 14 days and progress weightbearing as tolerated with a walker.  She will be reevaluated in orthopedic office in 4 weeks with new x-rays.  All with the boot off she may shower and get the incisions wet but is to avoid soaking in water.  Keep Steri-Strips in place for another 7 to 10 days at the lateral incision.  She is to work on range of motion of the ankle at home with the boot off.  No driving as yet.  Possible PT after next visit.

## 2025-06-05 NOTE — PROGRESS NOTES
Name: Brooklyn Vazquez      : 1960      MRN: [id number]  Encounter Provider: Kalyan Warren PA-C  Encounter Date: 2025   Encounter department: [facility] [place]  :  Assessment & Plan  Closed displaced bimalleolar fracture of right ankle with routine healing, subsequent encounter  Patient was fitted with a new cam boot and will return to her previous cam boot.  She may start 50% partial weightbearing for the next 10 to 14 days and progress weightbearing as tolerated with a walker.  She will be reevaluated in orthopedic office in 4 weeks with new x-rays.  All with the boot off she may shower and get the incisions wet but is to avoid soaking in water.  Keep Steri-Strips in place for another 7 to 10 days at the lateral incision.  She is to work on range of motion of the ankle at home with the boot off.  No driving as yet.  Possible PT after next visit.           History of Present Illness   HPI  Brooklyn Vazquez is a 64 y.o. female who presents for routine follow-up right ankle bimalleolar ORIF 2025, 6 weeks ago.  Patient notes that the cam boot that she got from us is too small as the Velcro does not wrap around her shin and she was getting irritation from the strap and therefore used an old ankle brace that she had at home but this is falling apart.  She also notes that she can feel the plate of the lateral ankle if there is anything pressing against the lateral ankle.  She notes that she has minimal discomfort medially but mild discomfort on the lateral aspect.  She has no calf pain chest pain or shortness of breath.  She denies any numbness or tingling.  Patient has a walker and multiple devices at home.    Review of Systems  Medications Ordered Prior to Encounter[1]   Social History[2]     Objective   There were no vitals taken for this visit.     Physical Exam    Right ankle lateral incision notes a thickened scab at the most proximal edge of the incision otherwise  incision is healed.  The medial incision also notes a tiny scab at the most proximal area.  There is no gross signs of infection.  Both areas were cleansed with iodine and Steri-Strips were applied laterally as a covering and secondary reinforcement and the medial side was left open to air.  She has good 15 degrees plantar flexion and dorsiflexion to neutral.  She is approximately 15 degrees inversion and eversion.  Tenderness laterally appears just more distally over the plate and over the medial malleolus very minimally.  She has negative heel tap test she has a negative squeeze test of the shin.  Light touch sensation is intact distally.  There is minimal swelling in the foot.    I personally viewed x-rays of the right ankle taken in the office today which document Taine position alignment of the fractures and hardware with slight progression of healing particularly on the distal medial articular surface area.         [1]   Current Outpatient Medications on File Prior to Visit   Medication Sig Dispense Refill    acetaminophen (TYLENOL) 325 mg tablet Take 650 mg by mouth every 6 (six) hours as needed for mild pain      aspirin 325 mg tablet Take 1 tablet (325 mg total) by mouth daily 30 tablet 0    fluticasone (FLONASE) 50 mcg/act nasal spray 2 sprays into each nostril daily 16 g 0    ondansetron (ZOFRAN-ODT) 4 mg disintegrating tablet Take 1 tablet (4 mg total) by mouth every 8 (eight) hours as needed for nausea or vomiting (Patient not taking: Reported on 4/30/2025) 20 tablet 0    oxyCODONE (Roxicodone) 5 immediate release tablet Take 1 tablet (5 mg total) by mouth every 6 (six) hours as needed for severe pain for up to 15 doses Max Daily Amount: 20 mg (Patient not taking: Reported on 4/30/2025) 15 tablet 0     No current facility-administered medications on file prior to visit.   [2]   Social History  Tobacco Use    Smoking status: Never     Passive exposure: Never    Smokeless tobacco: Never   Vaping Use     Vaping status: Never Used   Substance and Sexual Activity    Alcohol use: Not Currently     Comment: occasional    Drug use: No

## 2025-06-12 ENCOUNTER — OFFICE VISIT (OUTPATIENT)
Dept: OBGYN CLINIC | Facility: CLINIC | Age: 65
End: 2025-06-12

## 2025-06-12 ENCOUNTER — TELEPHONE (OUTPATIENT)
Dept: OBGYN CLINIC | Facility: HOSPITAL | Age: 65
End: 2025-06-12

## 2025-06-12 VITALS — HEIGHT: 60 IN | BODY MASS INDEX: 34.75 KG/M2 | WEIGHT: 177 LBS

## 2025-06-12 DIAGNOSIS — S91.001D WOUND OF RIGHT ANKLE, SUBSEQUENT ENCOUNTER: Primary | ICD-10-CM

## 2025-06-12 DIAGNOSIS — S82.841D CLOSED DISPLACED BIMALLEOLAR FRACTURE OF RIGHT ANKLE WITH ROUTINE HEALING, SUBSEQUENT ENCOUNTER: ICD-10-CM

## 2025-06-12 PROCEDURE — 99024 POSTOP FOLLOW-UP VISIT: CPT | Performed by: ORTHOPAEDIC SURGERY

## 2025-06-12 RX ORDER — CEPHALEXIN 500 MG/1
500 CAPSULE ORAL EVERY 6 HOURS SCHEDULED
Qty: 24 CAPSULE | Refills: 0 | Status: SHIPPED | OUTPATIENT
Start: 2025-06-12 | End: 2025-06-18

## 2025-06-12 RX ORDER — SACCHAROMYCES BOULARDII 250 MG
250 CAPSULE ORAL 2 TIMES DAILY
Qty: 28 CAPSULE | Refills: 0 | Status: SHIPPED | OUTPATIENT
Start: 2025-06-12 | End: 2025-06-26

## 2025-06-12 NOTE — TELEPHONE ENCOUNTER
"Caller: Self    Doctor: Dr. Sosa     Reason for call: Patient calling back, she is unable to send a message on [employer], every option says \":user unavailable\". Made patient aware, nurse will wait on guidance from Dr Au    Call back#: [phone]  "

## 2025-06-12 NOTE — PROGRESS NOTES
Name: Brooklyn Vazquez      : 1960      MRN: [id number]  Encounter Provider: Brian Au DO  Encounter Date: 2025   Encounter department: [facility] [place]  :  Assessment & Plan  Wound of right ankle, subsequent encounter    Orders:    saccharomyces boulardii (FLORASTOR) 250 mg capsule; Take 1 capsule (250 mg total) by mouth 2 (two) times a day for 14 days    Ambulatory Referral to Wound Care; Future    cephalexin (KEFLEX) 500 mg capsule; Take 1 capsule (500 mg total) by mouth every 6 (six) hours for 6 days    Closed displaced bimalleolar fracture of right ankle with routine healing, subsequent encounter  Due to the patient's wound she will start saline wet-to-dry dressings twice a day after cleaning the wound with Betadine ointment.  She will be referred to wound care.  She will start on Keflex 4 times daily and she was also given a prescription for a probiotic.  She may also eat yogurt once or twice daily.  She will be seen back in orthopedic office in 1 week.  At that time repeat x-rays will be taken of the right ankle.  Continue 50 % partial weightbearing in the cam boot.       History of Present Illness   HPI  Brooklyn Vazquez is a 64 y.o. female who presents as unscheduled appointment due to some redness and drainage about the lateral incision of her right ankle.  She is 7 weeks status post right ankle bimalleolar fracture ORIF on 2025.  Initially postop she had a little bit of cellulitis was placed on an antibiotic.  She did not pleat the antibiotic because she developed a yeast infection.  She called this morning with concerns about the incision.  She denies being diabetic.    Review of Systems  Medications Ordered Prior to Encounter[1]   Social History[2]     Objective   Ht 5' (1.524 m)   Wt 80.3 kg (177 lb)   BMI 34.57 kg/m²      Physical Exam    Right ankle presents with area of wound proximately 9 mm to the proximal aspect of the lateral incision waist  scab scant.  Type drainage.  There is erythema around this area.  No gross areas of fluctuance or fluid to be expressed.  The most proximal aspect of the medial incision notes a dry scab but no drainage.  She has no pain with passive range of motion of the ankle with plantarflexion/dorsiflexion.  Light touch sensation is intact distally.  There is no lymphangitic streaking.  There is no calf pain or Homans' sign.  The area was evaluated by Dr. Au as well.  The wound was cleansed with Betadine swab and then a saline wet-to-dry dressing was placed.           [1]   Current Outpatient Medications on File Prior to Visit   Medication Sig Dispense Refill    acetaminophen (TYLENOL) 325 mg tablet Take 650 mg by mouth every 6 (six) hours as needed for mild pain      aspirin 325 mg tablet Take 1 tablet (325 mg total) by mouth daily 30 tablet 0    fluticasone (FLONASE) 50 mcg/act nasal spray 2 sprays into each nostril daily 16 g 0    ondansetron (ZOFRAN-ODT) 4 mg disintegrating tablet Take 1 tablet (4 mg total) by mouth every 8 (eight) hours as needed for nausea or vomiting (Patient not taking: Reported on 4/30/2025) 20 tablet 0    oxyCODONE (Roxicodone) 5 immediate release tablet Take 1 tablet (5 mg total) by mouth every 6 (six) hours as needed for severe pain for up to 15 doses Max Daily Amount: 20 mg (Patient not taking: Reported on 4/30/2025) 15 tablet 0     No current facility-administered medications on file prior to visit.   [2]   Social History  Tobacco Use    Smoking status: Never     Passive exposure: Never    Smokeless tobacco: Never   Vaping Use    Vaping status: Never Used   Substance and Sexual Activity    Alcohol use: Not Currently     Comment: occasional    Drug use: No

## 2025-06-12 NOTE — TELEPHONE ENCOUNTER
Observation goals PRIOR TO DISCHARGE    Comments: 1) Improved lung exam- lung sounds course.   2) Seen by COPDcare team  . Not completed.  3) Stable vital signs   vitals stable.  /75 (BP Location: Right arm)   Pulse 84   Temp 97.8  F (36.6  C) (Oral)   Resp 16   Wt 63.3 kg (139 lb 8.8 oz)   SpO2 94%   Breastfeeding? No   BMI 23.95 kg/m    4) Patient feels safe to return to home- pending.     Patient had returned from dialysis. Run of v. Tach at dialysis, patient on tele monitoring now. Denies any chest pain. Still some SOB on exertion. Patient currently on RA, sats in the mid 90's%. Denies any pain currently. Will continue to monitor.     Nurse to notify provider when observation goals have been met and patient is ready for discharge.       Pt unable to send picture through [employer]. Should pt be forced on to schedule to be seen as next available is Monday?

## 2025-06-12 NOTE — TELEPHONE ENCOUNTER
Called and spoke with pt. She had a spot on her incision that steri strips were reapplied on last visit and iodine applied. She states the steri strips have fallen off and she did apply iodine a few more times and some wound wash yesterday but the spot isn't looking any better. It is red and there is some serous drainage coming from the wound. She has no fever but she is having a feeling like a bee sting in the wound. I did request she send a picture via [employer] and she will do that shortly.

## 2025-06-12 NOTE — PATIENT INSTRUCTIONS
Due to the patient's wound she will start saline wet-to-dry dressings twice a day after cleaning the wound with Betadine ointment.  She will be referred to wound care.  She will start on Keflex 4 times daily and she was also given a prescription for a probiotic.  She may also eat yogurt once or twice daily.  She will be seen back in orthopedic office in 1 week.  At that time repeat x-rays will be taken of the right ankle.  Continue 50 % partial weightbearing in the cam boot.

## 2025-06-12 NOTE — TELEPHONE ENCOUNTER
Caller: Patient    Doctor: Dr. Au    Reason for call: Patient had rt ankle surgery 4/22/25. She is noticing that the incision site is very red and has some oozing. She thinks she should get it checked but you have nothing until Monday. She would like to know if you would want to see her sooner. Please call patient. Thank you.    Call back#: [phone]

## 2025-06-13 ENCOUNTER — TELEPHONE (OUTPATIENT)
Dept: PAIN MEDICINE | Facility: CLINIC | Age: 65
End: 2025-06-13

## 2025-06-20 ENCOUNTER — OFFICE VISIT (OUTPATIENT)
Facility: CLINIC | Age: 65
End: 2025-06-20
Payer: COMMERCIAL

## 2025-06-20 ENCOUNTER — OFFICE VISIT (OUTPATIENT)
Dept: OBGYN CLINIC | Facility: CLINIC | Age: 65
End: 2025-06-20

## 2025-06-20 ENCOUNTER — HOSPITAL ENCOUNTER (OUTPATIENT)
Dept: RADIOLOGY | Facility: CLINIC | Age: 65
Discharge: HOME | End: 2025-06-20
Attending: ORTHOPAEDIC SURGERY
Payer: COMMERCIAL

## 2025-06-20 ENCOUNTER — TELEPHONE (OUTPATIENT)
Age: 65
End: 2025-06-20

## 2025-06-20 VITALS — BODY MASS INDEX: 34.95 KG/M2 | WEIGHT: 178 LBS | HEIGHT: 60 IN

## 2025-06-20 VITALS
WEIGHT: 178 LBS | HEART RATE: 67 BPM | SYSTOLIC BLOOD PRESSURE: 124 MMHG | RESPIRATION RATE: 20 BRPM | DIASTOLIC BLOOD PRESSURE: 62 MMHG | HEIGHT: 60 IN | BODY MASS INDEX: 34.95 KG/M2 | TEMPERATURE: 98.2 F

## 2025-06-20 DIAGNOSIS — Z87.81 S/P ORIF (OPEN REDUCTION INTERNAL FIXATION) FRACTURE: ICD-10-CM

## 2025-06-20 DIAGNOSIS — S82.841D CLOSED DISPLACED BIMALLEOLAR FRACTURE OF RIGHT ANKLE WITH ROUTINE HEALING, SUBSEQUENT ENCOUNTER: Primary | ICD-10-CM

## 2025-06-20 DIAGNOSIS — T81.89XA DELAYED SURGICAL WOUND HEALING, INITIAL ENCOUNTER: Primary | ICD-10-CM

## 2025-06-20 DIAGNOSIS — Z98.890 S/P ORIF (OPEN REDUCTION INTERNAL FIXATION) FRACTURE: ICD-10-CM

## 2025-06-20 DIAGNOSIS — S82.841D CLOSED DISPLACED BIMALLEOLAR FRACTURE OF RIGHT ANKLE WITH ROUTINE HEALING, SUBSEQUENT ENCOUNTER: ICD-10-CM

## 2025-06-20 PROCEDURE — 73610 X-RAY EXAM OF ANKLE: CPT

## 2025-06-20 PROCEDURE — 11042 DBRDMT SUBQ TIS 1ST 20SQCM/<: CPT | Performed by: FAMILY MEDICINE

## 2025-06-20 PROCEDURE — 99213 OFFICE O/P EST LOW 20 MIN: CPT | Performed by: FAMILY MEDICINE

## 2025-06-20 PROCEDURE — 99024 POSTOP FOLLOW-UP VISIT: CPT | Performed by: ORTHOPAEDIC SURGERY

## 2025-06-20 PROCEDURE — 99204 OFFICE O/P NEW MOD 45 MIN: CPT | Performed by: FAMILY MEDICINE

## 2025-06-20 RX ORDER — LIDOCAINE 40 MG/G
CREAM TOPICAL ONCE
Status: COMPLETED | OUTPATIENT
Start: 2025-06-20 | End: 2025-06-20

## 2025-06-20 RX ADMIN — LIDOCAINE: 40 CREAM TOPICAL at 10:43

## 2025-06-20 NOTE — TELEPHONE ENCOUNTER
Caller: Brooklyn     Doctor: Dr. Au    Reason for call: would like to know if the weight bearing as tolerated should be with or without the crutches?    Call back#: [phone]

## 2025-06-20 NOTE — PROGRESS NOTES
"Patient ID: Brooklyn Vazquez is a 64 y.o. female Date of Birth 1960       Chief Complaint   Patient presents with   • New Patient Visit     R ankle       Allergies:  Demerol [meperidine] and Oxycodone    Diagnosis:      Diagnosis ICD-10-CM Associated Orders   1. Delayed surgical wound healing, initial encounter  T81.89XA lidocaine (LMX) 4 % cream     Wound cleansing and dressings Medial;Lateral Ankle     Debridement Medial;Lateral Ankle      2. S/P ORIF (open reduction internal fixation) fracture  Z98.890 lidocaine (LMX) 4 % cream    Z87.81               Assessment & Plan:  Delayed healing of surgical wound right lateral ankle s/p ORIF DOS 4/22/2025: Small wound on lateral ankle with an otherwise well-healed incision with adherent fibrinous slough requiring surgical debridement.  Drainage moderate.  Periwound mildly pink without acute erythema, increased warmth, lymphangitic streaking.  No deep probe or probe to bone.  No surrounding induration, fluctuance, crepitus.  No purulence or malodor.  Bilateral lower extremities with mild edema and scattered superficial varicosities.  Surgical debridement  Cleanse with prophase  Apply PolyMem Max Ag to wound bed followed by gauze  Considering patient with edema and scattered varicosities, recommend addition of gentle compression with Spandagrip   Will obtain bedside ABIs at next follow-up visit  Continue to follow closely with Ortho, weightbearing guidelines per Ortho  Reviewed importance of close monitoring of wound. Should patient experience any acute change or any of the following symptoms including but not limited to fever, chills, increased pain at the wound, swelling, purulent drainage, foul odor, etc... to immediately proceed to emergency department. Pt expresses understanding.   Follow-up in 1 week or sooner if needed    Portions of the record may have been created with voice recognition software. Occasional wrong word or \"sound alike\" substitutions may have " occurred due to the inherent limitations of voice recognition software. Read the chart carefully and recognize, using context, where substitutions have occurred.    Subjective:   6/20/2025: Brooklyn is a 64-year-old female with a past medical history including but not limited to GERD who presents to the wound center today for evaluation of right ankle wound s/p ORIF on 4/22/2025.  She reports that this wound never fully healed since surgery, sometimes has a scab on it, sometimes drains.  Was recently on Keflex due to concerns for cellulitis which she completed.  Has an Ortho appointment later today, last had follow-up on 6/12.  She currently is using crutches and offloading boot.  She reports she has not had fever or chills, denies pain.  Does not wear compression socks.      The following portions of the patient's history were reviewed and updated as appropriate:   Problem List[1]  Past Medical History[2]  Past Surgical History[3]  Family History[4]   Social History[5]   Current Medications[6]    Review of Systems   Constitutional:  Negative for chills and fever.   Respiratory:  Negative for shortness of breath.    Cardiovascular:  Positive for leg swelling.        History of palpitations but denies any acute change   Skin:  Positive for wound.   Psychiatric/Behavioral:  The patient is not nervous/anxious.        Objective:  /62   Pulse 67   Temp 98.2 °F (36.8 °C)   Resp 20   Ht 5' (1.524 m)   Wt 80.7 kg (178 lb)   BMI 34.76 kg/m²         Physical Exam  Vitals and nursing note reviewed.   Constitutional:       General: She is not in acute distress.     Appearance: She is not ill-appearing, toxic-appearing or diaphoretic.     Eyes:      General: No scleral icterus.      Cardiovascular:      Rate and Rhythm: Normal rate.      Comments: Diminished but palpable DP/PT bilaterally  2+ nonpitting edema bilaterally  Pulmonary:      Effort: Pulmonary effort is normal. No respiratory distress.     Musculoskeletal:  "     Right lower le+ Edema present.      Left lower le+ Edema present.     Skin:     General: Skin is warm.      Findings: Wound present.           Comments: 1- Small wound on lateral ankle with an otherwise well-healed incision with adherent fibrinous slough requiring surgical debridement.  Drainage moderate.  Periwound mildly pink without acute erythema, increased warmth, lymphangitic streaking.  No deep probe or probe to bone.  No surrounding induration, fluctuance, crepitus.  No purulence or malodor.  Bilateral lower extremities with mild edema and scattered superficial varicosities.     Neurological:      Mental Status: She is alert and oriented to person, place, and time.     Psychiatric:         Mood and Affect: Mood normal.         Behavior: Behavior normal.         Wound 25 Surgical Closed Surgical Incision Ankle Medial;Lateral (Active)   Wound Image   25 1039   Wound Description Pink;Pale;Yellow 25 1040   Non-staged Wound Description Full thickness 25 1040   Wound Length (cm) 0.3 cm 25 1040   Wound Width (cm) 0.3 cm 25 1040   Wound Depth (cm) 0.4 cm 25 1040   Wound Surface Area (cm^2) 0.07 cm^2 25 1040   Wound Volume (cm^3) 0.019 cm^3 25 1040   Calculated Wound Volume (cm^3) 0.04 cm^3 25 1040   Drainage Amount Moderate 25 1040   Drainage Description Serosanguineous;Yellow 25 1040   Gita-wound Assessment Pink 25 1040       Debridement   Wound 25 Surgical Closed Surgical Incision Ankle Medial;Lateral     Date/Time: 2025 10:15 AM    Universal Protocol:  Consent: Verbal consent obtained  Risks and benefits: risks, benefits and alternatives were discussed  Consent given by: patient  Time out: Immediately prior to procedure a \"time out\" was called to verify the correct patient, procedure, equipment, support staff and site/side marked as required.  Patient understanding: patient states understanding of the procedure " being performed  Patient identity confirmed: verbally with patient    Debridement Details  Performed by: physician  Debridement type: surgical  Level of debridement: subcutaneous tissue  Pain control: lidocaine 4%    Post-debridement measurements  Length (cm): 0.3  Width (cm): 0.3  Depth (cm): 0.5  Percent debrided: 100%  Surface Area (cm^2): 0.07  Area Debrided (cm^2): 0.07  Volume (cm^3): 0.02    Tissue and other material debrided: subcutaneous tissue  Devitalized tissue debrided: fibrin and slough  Instrument(s) utilized: blade  Bleeding: medium  Hemostasis obtained with: pressure  Procedural pain (0-10): 0  Post-procedural pain: 0   Response to treatment: procedure was tolerated well       Postdebridement photo unavailable          Lab Results   Component Value Date    HGBA1C 5.5 01/08/2024       Wound Instructions:  Orders Placed This Encounter   Procedures   • Wound cleansing and dressings Medial;Lateral Ankle     Wash your hands with soap and water.  Remove old dressing, discard into plastic bag and place in trash.  Cleanse the wound with mld, unscented soap (Dove) prior to applying a clean dressing. Do not use tissue or cotton balls. Do not scrub the wound. Pat dry using gauze.    Shower yes . Use a new washcloth for the wound. Wash this area last.    Cleanse the wound with prophase  Apply polymem AG to the R ankle wound.    Cover with gauze  Secure with Josiah and tape  Change dressing 3 times a week    Elastic Tubular Stocking:   Spandagrip Size F  Tubular elastic bandage: Apply from base of toes to behind the knee. Apply in AM, may remove for sleep.  Avoid prolonged standing in one place.  Elevate leg(s) above the level of the heart when sitting or as much as possible.       Watch for signs of infection (redness,warmth to the touch, increased pain, odor, pus, swelling, fever, chills, nausea, vomiting). If you notice any of these call the wound center or proceed to the ER.     Standing Status:   Future      Expected Date:   6/27/2025     Expiration Date:   6/27/2025   • Debridement Medial;Lateral Ankle     This order was created via procedure documentation     Yaneli Hawkins MD         [1]  Patient Active Problem List  Diagnosis   • PONV (postoperative nausea and vomiting)   • GERD (gastroesophageal reflux disease)   • Obesity   • Closed displaced bimalleolar fracture of right lower leg   • Palpitations   • Anemia   [2]  Past Medical History:  Diagnosis Date   • GERD (gastroesophageal reflux disease)    • Known health problems: none    • PONV (postoperative nausea and vomiting)    [3]  Past Surgical History:  Procedure Laterality Date   • APPENDECTOMY     • FRACTURE SURGERY Right     arm   • HYSTERECTOMY     • MENISCECTOMY     • ORIF TIBIA & FIBULA FRACTURES Right 4/22/2025    Procedure: OPEN REDUCTION W/ INTERNAL FIXATION (ORIF) ANKLE;  Surgeon: Brian Au DO;  Location:  MAIN OR;  Service: Orthopedics   [4]  Family History  Problem Relation Name Age of Onset   • Hypertension Father     [5]  Social History  Socioeconomic History   • Marital status: /Civil Union   Tobacco Use   • Smoking status: Never     Passive exposure: Never   • Smokeless tobacco: Never   Vaping Use   • Vaping status: Never Used   Substance and Sexual Activity   • Alcohol use: Not Currently     Comment: occasional   • Drug use: No     Social Drivers of Health     Financial Resource Strain: Low Risk  (1/8/2024)    Received from [facility]    Financial Resource Strain    • Do you have any trouble paying for your medications, or do you think you might in the future?: No   Food Insecurity: No Food Insecurity (4/22/2025)    Nursing - Inadequate Food Risk Classification    • Ran Out of Food in the Last Year: Never true   Transportation Needs: No Transportation Needs (4/22/2025)    Nursing - Transportation Risk Classification    • Lack of Transportation: No   Social Connections: Socially Integrated (1/8/2024)    Received from [facility]     Social Connections    • How often do you feel lonely or isolated from those around you?: Never   Intimate Partner Violence: Unknown (4/22/2025)    Nursing IPS    • Physically Hurt by Someone: No    • Hurt or Threatened by Someone: No   Housing Stability: Unknown (4/22/2025)    Nursing: Inadequate Housing Risk Classification    • Unable to Pay for Housing in the Last Year: No    • Has Housing: No   [6]    Current Outpatient Medications:   •  acetaminophen (TYLENOL) 325 mg tablet, Take 650 mg by mouth every 6 (six) hours as needed for mild pain, Disp: , Rfl:   •  fluticasone (FLONASE) 50 mcg/act nasal spray, 2 sprays into each nostril daily, Disp: 16 g, Rfl: 0  •  ondansetron (ZOFRAN-ODT) 4 mg disintegrating tablet, Take 1 tablet (4 mg total) by mouth every 8 (eight) hours as needed for nausea or vomiting, Disp: 20 tablet, Rfl: 0  •  saccharomyces boulardii (FLORASTOR) 250 mg capsule, Take 1 capsule (250 mg total) by mouth 2 (two) times a day for 14 days, Disp: 28 capsule, Rfl: 0  •  aspirin 325 mg tablet, Take 1 tablet (325 mg total) by mouth daily (Patient not taking: Reported on 6/20/2025), Disp: 30 tablet, Rfl: 0  •  oxyCODONE (Roxicodone) 5 immediate release tablet, Take 1 tablet (5 mg total) by mouth every 6 (six) hours as needed for severe pain for up to 15 doses Max Daily Amount: 20 mg (Patient not taking: Reported on 4/30/2025), Disp: 15 tablet, Rfl: 0  No current facility-administered medications for this visit.

## 2025-06-20 NOTE — PATIENT INSTRUCTIONS
Orders Placed This Encounter   Procedures    Wound cleansing and dressings Medial;Lateral Ankle     Wash your hands with soap and water.  Remove old dressing, discard into plastic bag and place in trash.  Cleanse the wound with mld, unscented soap (Dove) prior to applying a clean dressing. Do not use tissue or cotton balls. Do not scrub the wound. Pat dry using gauze.    Shower yes . Use a new washcloth for the wound. Wash this area last.    Cleanse the wound with prophase  Apply polymem AG to the R ankle wound.    Cover with gauze  Secure with Josiah and tape  Change dressing 3 times a week    Elastic Tubular Stocking:   Spandagrip Size F  Tubular elastic bandage: Apply from base of toes to behind the knee. Apply in AM, may remove for sleep.  Avoid prolonged standing in one place.  Elevate leg(s) above the level of the heart when sitting or as much as possible.       Watch for signs of infection (redness,warmth to the touch, increased pain, odor, pus, swelling, fever, chills, nausea, vomiting). If you notice any of these call the wound center or proceed to the ER.     Standing Status:   Future     Expected Date:   6/27/2025     Expiration Date:   6/27/2025    Wound Procedure Treatment Medial;Lateral Ankle     This order was created via procedure documentation    Medicare Preventive Visit Patient Instructions  Thank you for completing your Welcome to Medicare Visit or Medicare Annual Wellness Visit today. Your next wellness visit will be due in one year (6/21/2026).  The screening/preventive services that you may require over the next 5-10 years are detailed below. Some tests may not apply to you based off risk factors and/or age. Screening tests ordered at today's visit but not completed yet may show as past due. Also, please note that scanned in results may not display below.  Preventive Screenings:  Service Recommendations Previous Testing/Comments   Colorectal Cancer Screening  * Colonoscopy    * Fecal Occult  Blood Test (FOBT)/Fecal Immunochemical Test (FIT)  * Fecal DNA/Cologuard Test  * Flexible Sigmoidoscopy Age: 45-75 years old   Colonoscopy: every 10 years (may be performed more frequently if at higher risk)  OR  FOBT/FIT: every 1 year  OR  Cologuard: every 3 years  OR  Sigmoidoscopy: every 5 years  Screening may be recommended earlier than age 45 if at higher risk for colorectal cancer. Also, an individualized decision between you and your healthcare provider will decide whether screening between the ages of 76-85 would be appropriate. Colonoscopy: Not on file  FOBT/FIT: Not on file  Cologuard: Not on file  Sigmoidoscopy: Not on file          Breast Cancer Screening Age: 40+ years old  Frequency: every 1-2 years  Not required if history of left and right mastectomy Mammogram: Not on file        Cervical Cancer Screening Between the ages of 21-29, pap smear recommended once every 3 years.   Between the ages of 30-65, can perform pap smear with HPV co-testing every 5 years.   Recommendations may differ for women with a history of total hysterectomy, cervical cancer, or abnormal pap smears in past. Pap Smear: Not on file        Hepatitis C Screening Once for adults born between 1945 and 1965  More frequently in patients at high risk for Hepatitis C Hep C Antibody: Not on file        Diabetes Screening 1-2 times per year if you're at risk for diabetes or have pre-diabetes Fasting glucose: No results in last 5 years (No results in last 5 years)  A1C: 5.5 % (1/8/2024)      Cholesterol Screening Once every 5 years if you don't have a lipid disorder. May order more often based on risk factors. Lipid panel: Not on file          Other Preventive Screenings Covered by Medicare:  Abdominal Aortic Aneurysm (AAA) Screening: covered once if your at risk. You're considered to be at risk if you have a family history of AAA.  Lung Cancer Screening: covers low dose CT scan once per year if you meet all of the following conditions:  (1) Age 55-77; (2) No signs or symptoms of lung cancer; (3) Current smoker or have quit smoking within the last 15 years; (4) You have a tobacco smoking history of at least 20 pack years (packs per day multiplied by number of years you smoked); (5) You get a written order from a healthcare provider.  Glaucoma Screening: covered annually if you're considered high risk: (1) You have diabetes OR (2) Family history of glaucoma OR (3)  aged 50 and older OR (4)  American aged 65 and older  Osteoporosis Screening: covered every 2 years if you meet one of the following conditions: (1) You're estrogen deficient and at risk for osteoporosis based off medical history and other findings; (2) Have a vertebral abnormality; (3) On glucocorticoid therapy for more than 3 months; (4) Have primary hyperparathyroidism; (5) On osteoporosis medications and need to assess response to drug therapy.   Last bone density test (DXA Scan): Not on file.  HIV Screening: covered annually if you're between the age of 15-65. Also covered annually if you are younger than 15 and older than 65 with risk factors for HIV infection. For pregnant patients, it is covered up to 3 times per pregnancy.    Immunizations:  Immunization Recommendations   Influenza Vaccine Annual influenza vaccination during flu season is recommended for all persons aged >= 6 months who do not have contraindications   Pneumococcal Vaccine   * Pneumococcal conjugate vaccine = PCV13 (Prevnar 13), PCV15 (Vaxneuvance), PCV20 (Prevnar 20)  * Pneumococcal polysaccharide vaccine = PPSV23 (Pneumovax) Adults 19-65 yo with certain risk factors or if 65+ yo  If never received any pneumonia vaccine: recommend Prevnar 20 (PCV20)  Give PCV20 if previously received 1 dose of PCV13 or PPSV23   Hepatitis B Vaccine 3 dose series if at intermediate or high risk (ex: diabetes, end stage renal disease, liver disease)   Respiratory syncytial virus (RSV) Vaccine - COVERED BY  MEDICARE PART D  * RSVPreF3 (Arexvy) CDC recommends that adults 60 years of age and older may receive a single dose of RSV vaccine using shared clinical decision-making (SCDM)   Tetanus (Td) Vaccine - COST NOT COVERED BY MEDICARE PART B Following completion of primary series, a booster dose should be given every 10 years to maintain immunity against tetanus. Td may also be given as tetanus wound prophylaxis.   Tdap Vaccine - COST NOT COVERED BY MEDICARE PART B Recommended at least once for all adults. For pregnant patients, recommended with each pregnancy.   Shingles Vaccine (Shingrix) - COST NOT COVERED BY MEDICARE PART B  2 shot series recommended in those 19 years and older who have or will have weakened immune systems or those 50 years and older     Health Maintenance Due:      Topic Date Due    Hepatitis C Screening  Never done    HIV Screening  Never done    Breast Cancer Screening: Mammogram  Never done    Colorectal Cancer Screening  11/21/2022     Immunizations Due:      Topic Date Due    Pneumococcal Vaccine: 50+ Years (1 of 1 - PCV) Never done    COVID-19 Vaccine (2 - 2024-25 season) 09/01/2024    Influenza Vaccine (Season Ended) 09/01/2025     Advance Directives   What are advance directives?  Advance directives are legal documents that state your wishes and plans for medical care. These plans are made ahead of time in case you lose your ability to make decisions for yourself. Advance directives can apply to any medical decision, such as the treatments you want, and if you want to donate organs.   What are the types of advance directives?  There are many types of advance directives, and each state has rules about how to use them. You may choose a combination of any of the following:  Living will:  This is a written record of the treatment you want. You can also choose which treatments you do not want, which to limit, and which to stop at a certain time. This includes surgery, medicine, IV fluid, and tube  feedings.   Durable power of  for healthcare (DPA):  This is a written record that states who you want to make healthcare choices for you when you are unable to make them for yourself. This person, called a proxy, is usually a family member or a friend. You may choose more than 1 proxy.  Do not resuscitate (DNR) order:  A DNR order is used in case your heart stops beating or you stop breathing. It is a request not to have certain forms of treatment, such as CPR. A DNR order may be included in other types of advance directives.  Medical directive:  This covers the care that you want if you are in a coma, near death, or unable to make decisions for yourself. You can list the treatments you want for each condition. Treatment may include pain medicine, surgery, blood transfusions, dialysis, IV or tube feedings, and a ventilator (breathing machine).  Values history:  This document has questions about your views, beliefs, and how you feel and think about life. This information can help others choose the care that you would choose.  Why are advance directives important?  An advance directive helps you control your care. Although spoken wishes may be used, it is better to have your wishes written down. Spoken wishes can be misunderstood, or not followed. Treatments may be given even if you do not want them. An advance directive may make it easier for your family to make difficult choices about your care.   Weight Management   Why it is important to manage your weight:  Being overweight increases your risk of health conditions such as heart disease, high blood pressure, type 2 diabetes, and certain types of cancer. It can also increase your risk for osteoarthritis, sleep apnea, and other respiratory problems. Aim for a slow, steady weight loss. Even a small amount of weight loss can lower your risk of health problems.  How to lose weight safely:  A safe and healthy way to lose weight is to eat fewer calories and  get regular exercise. You can lose up about 1 pound a week by decreasing the number of calories you eat by 500 calories each day.   Healthy meal plan for weight management:  A healthy meal plan includes a variety of foods, contains fewer calories, and helps you stay healthy. A healthy meal plan includes the following:  Eat whole-grain foods more often.  A healthy meal plan should contain fiber. Fiber is the part of grains, fruits, and vegetables that is not broken down by your body. Whole-grain foods are healthy and provide extra fiber in your diet. Some examples of whole-grain foods are whole-wheat breads and pastas, oatmeal, brown rice, and bulgur.  Eat a variety of vegetables every day.  Include dark, leafy greens such as spinach, kale, ren greens, and mustard greens. Eat yellow and orange vegetables such as carrots, sweet potatoes, and winter squash.   Eat a variety of fruits every day.  Choose fresh or canned fruit (canned in its own juice or light syrup) instead of juice. Fruit juice has very little or no fiber.  Eat low-fat dairy foods.  Drink fat-free (skim) milk or 1% milk. Eat fat-free yogurt and low-fat cottage cheese. Try low-fat cheeses such as mozzarella and other reduced-fat cheeses.  Choose meat and other protein foods that are low in fat.  Choose beans or other legumes such as split peas or lentils. Choose fish, skinless poultry (chicken or turkey), or lean cuts of red meat (beef or pork). Before you cook meat or poultry, cut off any visible fat.   Use less fat and oil.  Try baking foods instead of frying them. Add less fat, such as margarine, sour cream, regular salad dressing and mayonnaise to foods. Eat fewer high-fat foods. Some examples of high-fat foods include french fries, doughnuts, ice cream, and cakes.  Eat fewer sweets.  Limit foods and drinks that are high in sugar. This includes candy, cookies, regular soda, and sweetened drinks.  Exercise:  Exercise at least 30 minutes per day on  most days of the week. Some examples of exercise include walking, biking, dancing, and swimming. You can also fit in more physical activity by taking the stairs instead of the elevator or parking farther away from stores. Ask your healthcare provider about the best exercise plan for you.    © Copyright [employer] 2018 Information is for End User's use only and may not be sold, redistributed or otherwise used for commercial purposes. All illustrations and images included in CareNotes® are the copyrighted property of [employer] or [employer]

## 2025-06-20 NOTE — ASSESSMENT & PLAN NOTE
I would recommend follow-up in 4 weeks.  Once again, x-rays will be obtained including weightbearing AP, lateral and mortise views of her ankle.  In the meantime, she is to continue with the cam boot but is permitted to bear weight as tolerated.  I think it would be best to avoid using a brace as this would likely cause more localized pressure to the areas of incision which are now being treated with wound care.  If she sees complete healing, she is to contact the office and I can see her sooner than the 4-week follow-up as she is anxious to begin driving again.  However, at this time I would not recommend she drive.  Orders:    XR ankle 3+ vw right; Future

## 2025-06-20 NOTE — PROGRESS NOTES
Wound Procedure Treatment Medial;Lateral Ankle    Performed by: Viraj Parkinson RN  Authorized by: Yaneli Hawkins MD  Associated wounds:   Wound 04/22/25 Surgical Closed Surgical Incision Ankle Medial;Lateral    Applied primary dressing:  Polymem foam and Silver   Applied secondary dressing:  Gauze   Dressing secured with:  Josiah, Tape, Elastic tubular stocking and Size F

## 2025-06-20 NOTE — PROGRESS NOTES
Name: Brooklyn Vazquez      : 1960      MRN: [id number]  Encounter Provider: Brian Au DO  Encounter Date: 2025   Encounter department: [facility] [address] [facility] [place]  :  Assessment & Plan  Closed displaced bimalleolar fracture of right ankle with routine healing, subsequent encounter  I would recommend follow-up in 4 weeks.  Once again, x-rays will be obtained including weightbearing AP, lateral and mortise views of her ankle.  In the meantime, she is to continue with the cam boot but is permitted to bear weight as tolerated.  I think it would be best to avoid using a brace as this would likely cause more localized pressure to the areas of incision which are now being treated with wound care.  If she sees complete healing, she is to contact the office and I can see her sooner than the 4-week follow-up as she is anxious to begin driving again.  However, at this time I would not recommend she drive.  Orders:    XR ankle 3+ vw right; Future        History of Present Illness   HPI  Brooklyn Vazquez is a 64 y.o. female who presents for right ankle postop wound check.  She was just seen in wound care immediately prior to this visit.  She is 8 weeks status post right ankle bimalleolar fracture ORIF on 2025.  She is also getting x-rays today to evaluate healing.  As her last visit she had been doing saline wet-to-dry dressings and cleaning with Betadine.  She denies any improvement in the appearance of the wound but was seen by wound care earlier today.  They have initiated a program of dressing changes.    Review of Systems  Medications Ordered Prior to Encounter[1]   Social History[2]     Objective   Ht 5' (1.524 m)   Wt 80.7 kg (178 lb)   BMI 34.76 kg/m²      Physical Exam     Right ankle exam demonstrates the boot in place upon arrival.  This was removed without difficulty.  The dressings applied by the wound care service earlier this morning were not removed.  She denies any  tenderness with palpation of the medial and lateral ankle.  Distal sensation is intact.  Gentle range of motion elicits no complaints.    X-rays demonstrate acceptable progression of healing with no change in fracture alignment and stable fixation.           [1]   Current Outpatient Medications on File Prior to Visit   Medication Sig Dispense Refill    acetaminophen (TYLENOL) 325 mg tablet Take 650 mg by mouth every 6 (six) hours as needed for mild pain      fluticasone (FLONASE) 50 mcg/act nasal spray 2 sprays into each nostril daily 16 g 0    ondansetron (ZOFRAN-ODT) 4 mg disintegrating tablet Take 1 tablet (4 mg total) by mouth every 8 (eight) hours as needed for nausea or vomiting 20 tablet 0    saccharomyces boulardii (FLORASTOR) 250 mg capsule Take 1 capsule (250 mg total) by mouth 2 (two) times a day for 14 days 28 capsule 0    aspirin 325 mg tablet Take 1 tablet (325 mg total) by mouth daily (Patient not taking: Reported on 6/20/2025) 30 tablet 0    oxyCODONE (Roxicodone) 5 immediate release tablet Take 1 tablet (5 mg total) by mouth every 6 (six) hours as needed for severe pain for up to 15 doses Max Daily Amount: 20 mg (Patient not taking: Reported on 4/30/2025) 15 tablet 0     No current facility-administered medications on file prior to visit.   [2]   Social History  Tobacco Use    Smoking status: Never     Passive exposure: Never    Smokeless tobacco: Never   Vaping Use    Vaping status: Never Used   Substance and Sexual Activity    Alcohol use: Not Currently     Comment: occasional    Drug use: No

## 2025-06-20 NOTE — PROGRESS NOTES
Wound Procedure Treatment Medial;Lateral Ankle    Performed by: Viraj Parkinson RN  Authorized by: Yaneli Hawkins MD  Associated wounds:   Wound 04/22/25 Surgical Closed Surgical Incision Ankle Medial;Lateral    Applied primary dressing:  Silver and Polymem foam   Applied secondary dressing:  Gauze   Dressing secured with:  Josiah, Tape, Elastic tubular stocking and Size F

## 2025-06-27 ENCOUNTER — OFFICE VISIT (OUTPATIENT)
Facility: CLINIC | Age: 65
End: 2025-06-27
Payer: COMMERCIAL

## 2025-06-27 VITALS
DIASTOLIC BLOOD PRESSURE: 60 MMHG | HEART RATE: 68 BPM | RESPIRATION RATE: 16 BRPM | SYSTOLIC BLOOD PRESSURE: 108 MMHG | TEMPERATURE: 97 F

## 2025-06-27 DIAGNOSIS — T81.89XA DELAYED SURGICAL WOUND HEALING, INITIAL ENCOUNTER: Primary | ICD-10-CM

## 2025-06-27 PROCEDURE — 11042 DBRDMT SUBQ TIS 1ST 20SQCM/<: CPT | Performed by: FAMILY MEDICINE

## 2025-06-27 RX ORDER — LIDOCAINE 40 MG/G
CREAM TOPICAL ONCE
Status: COMPLETED | OUTPATIENT
Start: 2025-06-27 | End: 2025-06-27

## 2025-06-27 RX ADMIN — LIDOCAINE 1 APPLICATION: 40 CREAM TOPICAL at 11:34

## 2025-06-27 NOTE — PROGRESS NOTES
"Patient ID: Brooklyn Vazquez is a 64 y.o. female Date of Birth 1960       Chief Complaint   Patient presents with   • Follow Up Wound Care Visit       Allergies:  Demerol [meperidine] and Oxycodone    Diagnosis:      Diagnosis ICD-10-CM Associated Orders   1. Delayed surgical wound healing, initial encounter  T81.89XA Wound cleansing and dressings Medial;Lateral;Right Ankle     lidocaine (LMX) 4 % cream     Wound Procedure Treatment Medial;Lateral;Right Ankle     Debridement Medial;Lateral;Right Ankle              Assessment & Plan:  Delayed healing of surgical wound right lateral ankle s/p ORIF DOS 4/22/2025: Though no significant change to measurements as a whole, clinically appears improved with more granular tissue present.  There is still some adherent fibrinous slough requiring surgical debridement.  Drainage moderate.  Periwound mildly pink without acute erythema, increased warmth, lymphangitic streaking.  No deep probe or probe to bone.  No surrounding induration, fluctuance, crepitus.  No purulence or malodor.    Surgical debridement  Cleanse with prophase  Continue PolyMem Max Ag to wound bed followed by gauze  Compression: Considering patient with edema and scattered varicosities, continue gentle compression with Spandagrip  Bedside ABIs completed today L 1.0 / R 1.20  Continue to follow closely with Ortho, weightbearing guidelines per Ortho  Reviewed importance of close monitoring of wound. Should patient experience any acute change or any of the following symptoms including but not limited to fever, chills, increased pain at the wound, swelling, purulent drainage, foul odor, etc... to immediately proceed to emergency department. Pt expresses understanding.   Follow-up in 2 weeks or sooner if needed       Portions of the record may have been created with voice recognition software. Occasional wrong word or \"sound alike\" substitutions may have occurred due to the inherent limitations of voice " recognition software. Read the chart carefully and recognize, using context, where substitutions have occurred.    Subjective:   6/20/2025: Brooklyn is a 64-year-old female with a past medical history including but not limited to GERD who presents to the wound center today for evaluation of right ankle wound s/p ORIF on 4/22/2025.  She reports that this wound never fully healed since surgery, sometimes has a scab on it, sometimes drains.  Was recently on Keflex due to concerns for cellulitis which she completed.  Has an Ortho appointment later today, last had follow-up on 6/12.  She currently is using crutches and offloading boot.  She reports she has not had fever or chills, denies pain.  Does not wear compression socks.    6/27/2025: Brooklyn presents today for follow-up.  She reports she is doing well with no new acute complaints or issues.  Has not had fever or chills.  Saw Dr. Au last week and continues to use cam boot.        The following portions of the patient's history were reviewed and updated as appropriate:   Problem List[1]  Past Medical History[2]  Past Surgical History[3]  Family History[4]   Social History[5]   Current Medications[6]    Review of Systems   Constitutional:  Negative for chills and fever.   Cardiovascular:  Positive for leg swelling (Thinks this is improved).   Skin:  Positive for wound.   Psychiatric/Behavioral:  The patient is not nervous/anxious.        Objective:  /60   Pulse 68   Temp (!) 97 °F (36.1 °C)   Resp 16   Pain Score: 0-No pain     Physical Exam  Vitals and nursing note reviewed.   Constitutional:       General: She is not in acute distress.     Appearance: She is not ill-appearing, toxic-appearing or diaphoretic.     Eyes:      General: No scleral icterus.      Cardiovascular:      Rate and Rhythm: Normal rate.      Comments: Diminished but palpable DP/PT bilaterally  2+ nonpitting edema bilaterally  Pulmonary:      Effort: Pulmonary effort is normal. No  respiratory distress.     Musculoskeletal:      Right lower le+ Edema present.      Left lower le+ Edema present.     Skin:     General: Skin is warm.      Findings: Wound present.           Comments: 1- Though no significant change to measurements as a whole, clinically appears improved with more granular tissue present.  There is still some adherent fibrinous slough requiring surgical debridement.  Drainage moderate.  Periwound mildly pink without acute erythema, increased warmth, lymphangitic streaking.  No deep probe or probe to bone.  No surrounding induration, fluctuance, crepitus.  No purulence or malodor.        Bilateral lower extremities with mild edema and scattered superficial varicosities.     Neurological:      Mental Status: She is alert and oriented to person, place, and time.     Psychiatric:         Mood and Affect: Mood normal.         Behavior: Behavior normal.       Wound 25 Surgical Closed Surgical Incision Ankle Medial;Lateral;Right (Active)   Wound Image   25 1130   Wound Description Pink;Yellow;Granulation tissue 25 1131   Non-staged Wound Description Full thickness 25 1131   Wound Length (cm) 0.8 cm 25 1131   Wound Width (cm) 0.5 cm 25 1131   Wound Depth (cm) 0.2 cm 25 1131   Wound Surface Area (cm^2) 0.31 cm^2 25 1131   Wound Volume (cm^3) 0.042 cm^3 25 1131   Calculated Wound Volume (cm^3) 0.08 cm^3 25 1131   Change in Wound Size % -100 25 1131   Drainage Amount Moderate 25 1131   Drainage Description Serosanguineous 25 1131   Gita-wound Assessment Pink;Scar Tissue 25 1131       Debridement   Wound 25 Surgical Closed Surgical Incision Ankle Medial;Lateral;Right     Date/Time: 2025 11:00 AM    Universal Protocol:  Consent: Verbal consent obtained  Risks and benefits: risks, benefits and alternatives were discussed  Consent given by: patient  Time out: Immediately prior to procedure a  "\"time out\" was called to verify the correct patient, procedure, equipment, support staff and site/side marked as required.  Patient understanding: patient states understanding of the procedure being performed  Patient identity confirmed: verbally with patient    Debridement Details  Performed by: physician  Debridement type: surgical  Level of debridement: subcutaneous tissue  Pain control: lidocaine 4%    Post-debridement measurements  Length (cm): 0.8  Width (cm): 0.5  Depth (cm): 0.3  Percent debrided: 100%  Surface Area (cm^2): 0.31  Area Debrided (cm^2): 0.31  Volume (cm^3): 0.06    Tissue and other material debrided: subcutaneous tissue  Devitalized tissue debrided: fibrin and slough  Instrument(s) utilized: curette  Bleeding: medium  Hemostasis obtained with: pressure  Procedural pain (0-10): 0  Post-procedural pain: 0   Response to treatment: procedure was tolerated well                 Lab Results   Component Value Date    HGBA1C 5.5 01/08/2024       Wound Instructions:  Orders Placed This Encounter   Procedures   • Wound cleansing and dressings Medial;Lateral;Right Ankle     Wash your hands with soap and water.  Remove old dressing, discard into plastic bag and place in trash.  Cleanse the wound with mld, unscented soap (Dove) prior to applying a clean dressing. Do not use tissue or cotton balls. Do not scrub the wound. Pat dry using gauze.     Shower yes . Use a new washcloth for the wound. Wash this area last.     Cleanse the wound with prophase  Apply polymem AG to the R ankle wound.    Cover with gauze  Secure with Josiah and tape  Change dressing 3 times a week     Elastic Tubular Stocking:   Spandagrip Size F  Tubular elastic bandage: Apply from base of toes to behind the knee. Apply in AM, may remove for sleep.  Avoid prolonged standing in one place.  Elevate leg(s) above the level of the heart when sitting or as much as possible.         Watch for signs of infection (redness,warmth to the touch, " increased pain, odor, pus, swelling, fever, chills, nausea, vomiting). If you notice any of these call the wound center or proceed to the ER.    Follow up in 2 weeks     Standing Status:   Future     Expected Date:   6/27/2025     Expiration Date:   7/4/2025   • Wound Procedure Treatment Medial;Lateral;Right Ankle     This order was created via procedure documentation   • Debridement Medial;Lateral;Right Ankle     This order was created via procedure documentation       Yaneli Hawkins MD         [1]  Patient Active Problem List  Diagnosis   • PONV (postoperative nausea and vomiting)   • GERD (gastroesophageal reflux disease)   • Obesity   • Closed displaced bimalleolar fracture of right lower leg   • Palpitations   • Anemia   [2]  Past Medical History:  Diagnosis Date   • GERD (gastroesophageal reflux disease)    • Known health problems: none    • PONV (postoperative nausea and vomiting)    [3]  Past Surgical History:  Procedure Laterality Date   • APPENDECTOMY     • FRACTURE SURGERY Right     arm   • HYSTERECTOMY     • MENISCECTOMY     • ORIF TIBIA & FIBULA FRACTURES Right 4/22/2025    Procedure: OPEN REDUCTION W/ INTERNAL FIXATION (ORIF) ANKLE;  Surgeon: Brian Au DO;  Location:  MAIN OR;  Service: Orthopedics   [4]  Family History  Problem Relation Name Age of Onset   • Hypertension Father     [5]  Social History  Socioeconomic History   • Marital status: /Civil Union   Tobacco Use   • Smoking status: Never     Passive exposure: Never   • Smokeless tobacco: Never   Vaping Use   • Vaping status: Never Used   Substance and Sexual Activity   • Alcohol use: Not Currently     Comment: occasional   • Drug use: No     Social Drivers of Health     Financial Resource Strain: Low Risk  (1/8/2024)    Received from [employer]    Financial Resource Strain    • Do you have any trouble paying for your medications, or do you think you might in the future?: No   Food Insecurity: No Food Insecurity (4/22/2025)     Nursing - Inadequate Food Risk Classification    • Ran Out of Food in the Last Year: Never true   Transportation Needs: No Transportation Needs (4/22/2025)    Nursing - Transportation Risk Classification    • Lack of Transportation: No   Social Connections: Socially Integrated (1/8/2024)    Received from [employer]    Social Connections    • How often do you feel lonely or isolated from those around you?: Never   Intimate Partner Violence: Unknown (4/22/2025)    Nursing IPS    • Physically Hurt by Someone: No    • Hurt or Threatened by Someone: No   Housing Stability: Unknown (4/22/2025)    Nursing: Inadequate Housing Risk Classification    • Unable to Pay for Housing in the Last Year: No    • Has Housing: No   [6]    Current Outpatient Medications:   •  acetaminophen (TYLENOL) 325 mg tablet, Take 650 mg by mouth every 6 (six) hours as needed for mild pain, Disp: , Rfl:   •  aspirin 325 mg tablet, Take 1 tablet (325 mg total) by mouth daily (Patient not taking: Reported on 6/20/2025), Disp: 30 tablet, Rfl: 0  •  fluticasone (FLONASE) 50 mcg/act nasal spray, 2 sprays into each nostril daily, Disp: 16 g, Rfl: 0  •  ondansetron (ZOFRAN-ODT) 4 mg disintegrating tablet, Take 1 tablet (4 mg total) by mouth every 8 (eight) hours as needed for nausea or vomiting, Disp: 20 tablet, Rfl: 0  •  oxyCODONE (Roxicodone) 5 immediate release tablet, Take 1 tablet (5 mg total) by mouth every 6 (six) hours as needed for severe pain for up to 15 doses Max Daily Amount: 20 mg (Patient not taking: Reported on 4/30/2025), Disp: 15 tablet, Rfl: 0  No current facility-administered medications for this visit.

## 2025-06-27 NOTE — PROGRESS NOTES
Wound Procedure Treatment Medial;Lateral;Right Ankle    Performed by: Brina Merchant RN  Authorized by: Yaneli Hawkins MD  Associated wounds:   Wound 04/22/25 Surgical Closed Surgical Incision Ankle Medial;Lateral;Right    Wound cleansed with:  NSS   Applied primary dressing:  Silver and Polymem foam   Applied secondary dressing:  Gauze   Dressing secured with:  Josiah, Tape, Elastic tubular stocking and Size F   Offloading device appllied:  CAM

## 2025-06-27 NOTE — PATIENT INSTRUCTIONS
Orders Placed This Encounter   Procedures    Wound cleansing and dressings Medial;Lateral;Right Ankle     Wash your hands with soap and water.  Remove old dressing, discard into plastic bag and place in trash.  Cleanse the wound with mld, unscented soap (Dove) prior to applying a clean dressing. Do not use tissue or cotton balls. Do not scrub the wound. Pat dry using gauze.     Shower yes . Use a new washcloth for the wound. Wash this area last.     Cleanse the wound with prophase  Apply polymem AG to the R ankle wound.    Cover with gauze  Secure with Josiah and tape  Change dressing 3 times a week     Elastic Tubular Stocking:   Spandagrip Size F  Tubular elastic bandage: Apply from base of toes to behind the knee. Apply in AM, may remove for sleep.  Avoid prolonged standing in one place.  Elevate leg(s) above the level of the heart when sitting or as much as possible.         Watch for signs of infection (redness,warmth to the touch, increased pain, odor, pus, swelling, fever, chills, nausea, vomiting). If you notice any of these call the wound center or proceed to the ER.    Follow up in 2 weeks     Standing Status:   Future     Expected Date:   6/27/2025     Expiration Date:   7/4/2025

## 2025-07-10 ENCOUNTER — HOSPITAL ENCOUNTER (OUTPATIENT)
Dept: RADIOLOGY | Facility: CLINIC | Age: 65
End: 2025-07-10
Attending: ORTHOPAEDIC SURGERY
Payer: COMMERCIAL

## 2025-07-10 ENCOUNTER — OFFICE VISIT (OUTPATIENT)
Dept: OBGYN CLINIC | Facility: CLINIC | Age: 65
End: 2025-07-10

## 2025-07-10 VITALS — WEIGHT: 178 LBS | BODY MASS INDEX: 34.95 KG/M2 | HEIGHT: 60 IN

## 2025-07-10 DIAGNOSIS — S82.841D CLOSED DISPLACED BIMALLEOLAR FRACTURE OF RIGHT ANKLE WITH ROUTINE HEALING, SUBSEQUENT ENCOUNTER: Primary | ICD-10-CM

## 2025-07-10 DIAGNOSIS — S82.841D CLOSED DISPLACED BIMALLEOLAR FRACTURE OF RIGHT ANKLE WITH ROUTINE HEALING, SUBSEQUENT ENCOUNTER: ICD-10-CM

## 2025-07-10 DIAGNOSIS — S91.001D WOUND OF RIGHT ANKLE, SUBSEQUENT ENCOUNTER: ICD-10-CM

## 2025-07-10 PROCEDURE — 99024 POSTOP FOLLOW-UP VISIT: CPT | Performed by: ORTHOPAEDIC SURGERY

## 2025-07-10 PROCEDURE — 73610 X-RAY EXAM OF ANKLE: CPT

## 2025-07-10 NOTE — PATIENT INSTRUCTIONS
Continue wound care instructions as previous with follow-up appointment scheduled for Tuesday, 7/15/2025.  Placed the patient in a lace up ankle brace to be worn for weightbearing to provide protection as the medial malleolus shows not complete healing as yet.  May remove the brace when she is not weightbearing but should weight-bear with the brace in place.  She will be reevaluated in orthopedic office in 4 weeks with repeat x-ray.

## 2025-07-10 NOTE — PROGRESS NOTES
"Name: Brooklyn Vazquez      : 1960      MRN: [id number]  Encounter Provider: Brian Au DO  Encounter Date: 7/10/2025   Encounter department: [facility] [place]  :  Assessment & Plan  Closed displaced bimalleolar fracture of right ankle with routine healing, subsequent encounter  Continue wound care instructions as previous with follow-up appointment scheduled for Tuesday, 7/15/2025.  Placed the patient in a lace up ankle brace to be worn for weightbearing to provide protection as the medial malleolus shows not complete healing as yet.  May remove the brace when she is not weightbearing but should weight-bear with the brace in place.  She will be reevaluated in orthopedic office in 4 weeks with repeat x-ray.       Wound of right ankle, subsequent encounter         History of Present Illness   HPI  Brooklyn Vazquez is a 64 y.o. female who presents follow-up x-ray of postoperative right bimalleolar ankle fracture ORIF with DOS 2025, 11 weeks ago.  She reports that she stopped using the cam boot approximately 1 week ago as she is developing hip pain from the altered height.  She notes since discontinuing the cam boot that the hip feels better.  She denies any significant ankle pain being out of the boot.  She denies being diabetic her last hemoglobin A1c done 2024 was normal with a value of 5.5.  She reports she sees no obvious change in the wound states has been present for 3 months and not healing causing patient to feel \"disgusted\".  She still notes some anterior ankle pain with weightbearing.  She denies medial or lateral malleoli or type pain with weightbearing.    Review of Systems  Medications Ordered Prior to Encounter[1]   Social History[2]     Objective   There were no vitals taken for this visit.     Physical Exam    Right ankle at the medial incision is completely healed.  The lateral incision still has the incisional wound without gross signs of infection.  " Size is approximately 8 mm x 10 mm.  There is minimal there is drainage on the dressing which she placed after showering this morning.  The wound is mildly pink without erythema or warmth.  The wound was cleansed with a Betadine swab and alcohol and a dry 2 x 2 gauze was placed over top until she can return home and use her wound care dressings as instructed.  Patient appeared to walk out of the office building with less limp wearing the ankle brace.    I personally reviewed x-rays of the right ankle taken in the office today which document the distal fibula fracture is completely healed.  The medial malleolus fracture is also shows increased healing from previous visit particularly on the lateral and mortise views.         [1]   Current Outpatient Medications on File Prior to Visit   Medication Sig Dispense Refill    acetaminophen (TYLENOL) 325 mg tablet Take 650 mg by mouth every 6 (six) hours as needed for mild pain      aspirin 325 mg tablet Take 1 tablet (325 mg total) by mouth daily (Patient not taking: Reported on 6/20/2025) 30 tablet 0    fluticasone (FLONASE) 50 mcg/act nasal spray 2 sprays into each nostril daily 16 g 0    ondansetron (ZOFRAN-ODT) 4 mg disintegrating tablet Take 1 tablet (4 mg total) by mouth every 8 (eight) hours as needed for nausea or vomiting 20 tablet 0    oxyCODONE (Roxicodone) 5 immediate release tablet Take 1 tablet (5 mg total) by mouth every 6 (six) hours as needed for severe pain for up to 15 doses Max Daily Amount: 20 mg (Patient not taking: Reported on 4/30/2025) 15 tablet 0     No current facility-administered medications on file prior to visit.   [2]   Social History  Tobacco Use    Smoking status: Never     Passive exposure: Never    Smokeless tobacco: Never   Vaping Use    Vaping status: Never Used   Substance and Sexual Activity    Alcohol use: Not Currently     Comment: occasional    Drug use: No

## 2025-07-15 ENCOUNTER — OFFICE VISIT (OUTPATIENT)
Facility: CLINIC | Age: 65
End: 2025-07-15
Payer: COMMERCIAL

## 2025-07-15 VITALS
HEART RATE: 85 BPM | TEMPERATURE: 97.6 F | DIASTOLIC BLOOD PRESSURE: 59 MMHG | RESPIRATION RATE: 18 BRPM | SYSTOLIC BLOOD PRESSURE: 125 MMHG

## 2025-07-15 DIAGNOSIS — T81.89XA DELAYED SURGICAL WOUND HEALING, INITIAL ENCOUNTER: Primary | ICD-10-CM

## 2025-07-15 PROCEDURE — 11042 DBRDMT SUBQ TIS 1ST 20SQCM/<: CPT | Performed by: FAMILY MEDICINE

## 2025-07-15 RX ORDER — LIDOCAINE 40 MG/G
CREAM TOPICAL ONCE
Status: COMPLETED | OUTPATIENT
Start: 2025-07-15 | End: 2025-07-15

## 2025-07-15 RX ADMIN — LIDOCAINE 1 APPLICATION: 40 CREAM TOPICAL at 15:12

## 2025-07-18 ENCOUNTER — TELEPHONE (OUTPATIENT)
Age: 65
End: 2025-07-18

## 2025-07-18 NOTE — TELEPHONE ENCOUNTER
Caller: Brooklyn (patient)    Doctor: Dr. Vazquez    Reason for call: Patient called because she is having side effects (Ear Ringing) from Pravastatin and is no longer taking this medication. Patient would like to have a medication change to something else for cholesterol treatment. Please call patient back to discuss. Thank you!    Call back#: [phone]

## 2025-07-18 NOTE — TELEPHONE ENCOUNTER
Caller: Brooklyn    Doctor: Dr. Au    Reason for call: patient just noticed she had an appointment today. She is unsure why she has an appointment she was just in the office last week and had Xrays.  Patient doesn't want it to a no show appointment  Please advise the patient  Thank you    Call back#: [phone]

## 2025-07-22 ENCOUNTER — OFFICE VISIT (OUTPATIENT)
Facility: CLINIC | Age: 65
End: 2025-07-22
Payer: COMMERCIAL

## 2025-07-22 VITALS
TEMPERATURE: 97.8 F | HEART RATE: 73 BPM | DIASTOLIC BLOOD PRESSURE: 58 MMHG | RESPIRATION RATE: 18 BRPM | SYSTOLIC BLOOD PRESSURE: 111 MMHG

## 2025-07-22 DIAGNOSIS — T81.89XA DELAYED SURGICAL WOUND HEALING, INITIAL ENCOUNTER: Primary | ICD-10-CM

## 2025-07-22 PROCEDURE — 11042 DBRDMT SUBQ TIS 1ST 20SQCM/<: CPT | Performed by: FAMILY MEDICINE

## 2025-07-22 RX ORDER — LIDOCAINE 40 MG/G
CREAM TOPICAL ONCE
Status: COMPLETED | OUTPATIENT
Start: 2025-07-22 | End: 2025-07-22

## 2025-07-22 RX ADMIN — LIDOCAINE: 40 CREAM TOPICAL at 14:56

## 2025-07-22 NOTE — PROGRESS NOTES
Wound Procedure Treatment Medial;Lateral;Right Ankle    Performed by: Viraj Parkinson RN  Authorized by: Yaneli Hawkins MD  Associated wounds:   Wound 04/22/25 Surgical Closed Surgical Incision Ankle Medial;Lateral;Right    Wound cleansed with:  NSS   Applied primary dressing:  Collagen dressing and Silver   Applied secondary dressing:  Gauze   Dressing secured with:  Josiah, Tape and Compression wrap   Comments:  Puracolarissa AG, setopress-green squares

## 2025-07-22 NOTE — PROGRESS NOTES
Patient ID: Brooklyn Vazquez is a 64 y.o. female Date of Birth 1960       Chief Complaint   Patient presents with   • Follow Up Wound Care Visit     R leg       Allergies:  Demerol [meperidine] and Oxycodone    Diagnosis:      Diagnosis ICD-10-CM Associated Orders   1. Delayed surgical wound healing, initial encounter  T81.89XA lidocaine (LMX) 4 % cream     Wound cleansing and dressings Medial;Lateral;Right Ankle     Wound Procedure Treatment Medial;Lateral;Right Ankle     Debridement Medial;Lateral;Right Ankle              Assessment & Plan:  Delayed healing of surgical wound right lateral ankle s/p ORIF DOS 4/22/2025: Measurements are improved!  Area of unstable eschar, slough and fibrin in wound bed requiring surgical debridement.  Drainage is small.  Periwound mildly pink without acute erythema, increased warmth, lymphangitic streaking.  No deep probe or probe to bone.  No surrounding induration, fluctuance, crepitus.  No purulence or malodor.    Surgical debridement  Continue to cleanse with prophase   Continue Puracol Ag  Compression: Need improved edema control. Would like to increase compression, will start with SurePress green squares, if able to tolerate will increase to brown squares  Bedside ABIs completed at a previous visit L 1.0 / R 1.20; reviewed and appropriate  Continue to follow closely with Ortho, weightbearing/walking guidelines per Ortho.  As she has been ambulating more, reviewed extreme importance of taking breaks throughout the day to elevate legs above level of heart to aid with swelling as uncontrolled edema can increase risk for wound deterioration and further delays in healing.  She expressed understanding.  Reviewed importance of close monitoring of wound. Should patient experience any acute change or any of the following symptoms including but not limited to fever, chills, increased pain at the wound, swelling, purulent drainage, foul odor, etc... to immediately proceed to  "emergency department. Pt expresses understanding.   Follow-up in 1 week or sooner if needed    Portions of the record may have been created with voice recognition software. Occasional wrong word or \"sound alike\" substitutions may have occurred due to the inherent limitations of voice recognition software. Read the chart carefully and recognize, using context, where substitutions have occurred.    Subjective:   6/20/2025: Brooklyn is a 64-year-old female with a past medical history including but not limited to GERD who presents to the wound center today for evaluation of right ankle wound s/p ORIF on 4/22/2025.  She reports that this wound never fully healed since surgery, sometimes has a scab on it, sometimes drains.  Was recently on Keflex due to concerns for cellulitis which she completed.  Has an Ortho appointment later today, last had follow-up on 6/12.  She currently is using crutches and offloading boot.  She reports she has not had fever or chills, denies pain.  Does not wear compression socks.    6/27/2025: Brooklyn presents today for follow-up.  She reports she is doing well with no new acute complaints or issues.  Has not had fever or chills.  Saw Dr. Au last week and continues to use cam boot.    7/15/2025: Brooklyn presents today for follow-up.  She reports that overall she feels her progress has been slow and is frustrated in general -not just with her wound but states that since her injury/surgery she feels for her ankle just has not been right.  She states that she has been walking more notices more swelling in her legs especially at the end of the day.  She reports this improves by the morning after she has been elevating.  She states that she is on her feet for most of the day and does not take breaks to elevate throughout the day.  She has not had fever or chills.  Denies shortness breath, chest pain, palpitations.  No other new acute complaints today.    7/22/2025: Brooklyn presents today for follow-up.  " She reports she thinks her wound is been looking better.  States the Puracol is dissolving.  Has not had fevers or chills.      The following portions of the patient's history were reviewed and updated as appropriate:   Problem List[1]  Past Medical History[1]  Past Surgical History[1]  Family History[1]   Social History[1]   Current Medications[1]    Review of Systems   Constitutional:  Negative for chills and fever.   Respiratory:  Negative for shortness of breath.    Cardiovascular:  Positive for leg swelling. Negative for chest pain and palpitations.   Skin:  Positive for wound.   Psychiatric/Behavioral:  The patient is not nervous/anxious.        Objective:  /58   Pulse 73   Temp 97.8 °F (36.6 °C)   Resp 18         Physical Exam  Vitals and nursing note reviewed.   Constitutional:       General: She is not in acute distress.     Appearance: She is not ill-appearing, toxic-appearing or diaphoretic.     Eyes:      General: No scleral icterus.      Cardiovascular:      Rate and Rhythm: Normal rate.      Comments: Diminished but palpable DP/PT bilaterally  Edema unchanged - 2+ nonpitting edema bilaterally  Pulmonary:      Effort: Pulmonary effort is normal. No respiratory distress.     Musculoskeletal:      Right lower le+ Edema present.      Left lower le+ Edema present.     Skin:     General: Skin is warm.      Findings: Wound present.           Comments: 1-  Measurements are improved!  Area of unstable eschar, slough and fibrin in wound bed requiring surgical debridement.  Drainage is small.  Periwound mildly pink without acute erythema, increased warmth, lymphangitic streaking.  No deep probe or probe to bone.  No surrounding induration, fluctuance, crepitus.  No purulence or malodor.        Bilateral lower extremities with mild edema and scattered superficial varicosities.     Neurological:      Mental Status: She is alert and oriented to person, place, and time.     Psychiatric:         Mood  "and Affect: Mood normal.         Behavior: Behavior normal.       Wound 04/22/25 Surgical Closed Surgical Incision Ankle Medial;Lateral;Right (Active)   Wound Image   07/22/25 1509   Wound Description Pale;Pink;Brown 07/22/25 1457   Non-staged Wound Description Partial thickness 07/22/25 1457   Wound Length (cm) 0.6 cm 07/22/25 1457   Wound Width (cm) 0.3 cm 07/22/25 1457   Wound Depth (cm) 0.2 cm 07/22/25 1457   Wound Surface Area (cm^2) 0.14 cm^2 07/22/25 1457   Wound Volume (cm^3) 0.019 cm^3 07/22/25 1457   Calculated Wound Volume (cm^3) 0.04 cm^3 07/22/25 1457   Change in Wound Size % 0 07/22/25 1457   Drainage Amount Scant 07/22/25 1457   Drainage Description Serosanguineous 07/22/25 1457   Gita-wound Assessment Pink;Scar Tissue 07/15/25 1508     Debridement   Wound 04/22/25 Surgical Closed Surgical Incision Ankle Medial;Lateral;Right     Date/Time: 7/22/2025 2:45 PM    Universal Protocol:  Consent: Verbal consent obtained  Risks and benefits: risks, benefits and alternatives were discussed  Consent given by: patient  Time out: Immediately prior to procedure a \"time out\" was called to verify the correct patient, procedure, equipment, support staff and site/side marked as required.  Patient understanding: patient states understanding of the procedure being performed  Patient identity confirmed: verbally with patient    Debridement Details  Performed by: physician  Debridement type: surgical  Level of debridement: subcutaneous tissue  Pain control: lidocaine 4%    Post-debridement measurements  Length (cm): 0.6  Width (cm): 0.3  Depth (cm): 0.3  Percent debrided: 100%  Surface Area (cm^2): 0.14  Area Debrided (cm^2): 0.14  Volume (cm^3): 0.03    Tissue and other material debrided: subcutaneous tissue  Devitalized tissue debrided: fibrin, slough and eschar  Instrument(s) utilized: blade  Bleeding: medium  Hemostasis obtained with: pressure  Procedural pain (0-10): 0  Post-procedural pain: 0   Response to " treatment: procedure was tolerated well                 Lab Results   Component Value Date    HGBA1C 5.5 01/08/2024       Wound Instructions:  Orders Placed This Encounter   Procedures   • Wound cleansing and dressings Medial;Lateral;Right Ankle     Medial;Lateral;Right Ankle       Wash your hands with soap and water.  Remove old dressing, discard into plastic bag and place in trash.  Cleanse the wound with mld, unscented soap (Dove) prior to applying a clean dressing. Do not use tissue or cotton balls. Do not scrub the wound. Pat dry using gauze.     Shower yes . Use a new washcloth for the wound. Wash this area last.       Cleanse the wound with prophase  Apply puracol AG (collagen AG) to the R ankle wound.    Moisten with saline if you aren't having a lot of drainage (if the wound is dry)  Cover with gauze  Secure with Josiah and tape  Change dressing 3 times a week     Surepress- Green side up  Apply compression wrap to your affected Leg(s) from mid-foot to knee making sure to cover the heel. Apply in the morning and re-wrap as needed during the day if wrap becomes loose. Remove at bedtime and elevate legs or lie down.  Avoid prolonged standing in one place.  Elevate leg(s) above the level of the heart when sitting or as much as possible.        Watch for signs of infection (redness,warmth to the touch, increased pain, odor, pus, swelling, fever, chills, nausea, vomiting). If you notice any of these call the wound center or proceed to the ER.     Standing Status:   Future     Expiration Date:   7/29/2025   • Wound Procedure Treatment Medial;Lateral;Right Ankle     This order was created via procedure documentation   • Debridement Medial;Lateral;Right Ankle     This order was created via procedure documentation         Yaneli Hawkins MD         [1]  Patient Active Problem List  Diagnosis   • PONV (postoperative nausea and vomiting)   • GERD (gastroesophageal reflux disease)   • Obesity   • Closed displaced  bimalleolar fracture of right lower leg   • Palpitations   • Anemia   • Closed displaced bimalleolar fracture of right ankle with routine healing, subsequent encounter   [1]  Past Medical History:  Diagnosis Date   • GERD (gastroesophageal reflux disease)    • Known health problems: none    • PONV (postoperative nausea and vomiting)    [1]  Past Surgical History:  Procedure Laterality Date   • APPENDECTOMY     • FRACTURE SURGERY Right     arm   • HYSTERECTOMY     • MENISCECTOMY     • ORIF TIBIA & FIBULA FRACTURES Right 4/22/2025    Procedure: OPEN REDUCTION W/ INTERNAL FIXATION (ORIF) ANKLE;  Surgeon: Brian Au DO;  Location:  MAIN OR;  Service: Orthopedics   [1]  Family History  Problem Relation Name Age of Onset   • Hypertension Father     [1]  Social History  Socioeconomic History   • Marital status: /Civil Union   Tobacco Use   • Smoking status: Never     Passive exposure: Never   • Smokeless tobacco: Never   Vaping Use   • Vaping status: Never Used   Substance and Sexual Activity   • Alcohol use: Not Currently     Comment: occasional   • Drug use: No     Social Drivers of Health     Financial Resource Strain: Low Risk  (1/8/2024)    Received from [email or web address]    Financial Resource Strain    • Do you have any trouble paying for your medications, or do you think you might in the future?: No   Food Insecurity: No Food Insecurity (4/22/2025)    Nursing - Inadequate Food Risk Classification    • Ran Out of Food in the Last Year: Never true   Transportation Needs: No Transportation Needs (4/22/2025)    Nursing - Transportation Risk Classification    • Lack of Transportation: No   Social Connections: Socially Integrated (1/8/2024)    Received from [email or web address]    Social Connections    • How often do you feel lonely or isolated from those around you?: Never   Intimate Partner Violence: Unknown (4/22/2025)    Nursing IPS    • Physically Hurt by Someone: No    • Hurt or Threatened by Someone: No   Housing  Stability: Unknown (4/22/2025)    Nursing: Inadequate Housing Risk Classification    • Unable to Pay for Housing in the Last Year: No    • Has Housing: No   [1]    Current Outpatient Medications:   •  acetaminophen (TYLENOL) 325 mg tablet, Take 650 mg by mouth every 6 (six) hours as needed for mild pain, Disp: , Rfl:   •  fluticasone (FLONASE) 50 mcg/act nasal spray, 2 sprays into each nostril daily, Disp: 16 g, Rfl: 0  •  ondansetron (ZOFRAN-ODT) 4 mg disintegrating tablet, Take 1 tablet (4 mg total) by mouth every 8 (eight) hours as needed for nausea or vomiting, Disp: 20 tablet, Rfl: 0  No current facility-administered medications for this visit.

## 2025-07-22 NOTE — PATIENT INSTRUCTIONS
Orders Placed This Encounter   Procedures    Wound cleansing and dressings Medial;Lateral;Right Ankle     Medial;Lateral;Right Ankle       Wash your hands with soap and water.  Remove old dressing, discard into plastic bag and place in trash.  Cleanse the wound with mld, unscented soap (Dove) prior to applying a clean dressing. Do not use tissue or cotton balls. Do not scrub the wound. Pat dry using gauze.     Shower yes . Use a new washcloth for the wound. Wash this area last.       Cleanse the wound with prophase  Apply puracol AG (collagen AG) to the R ankle wound.    Moisten with saline if you aren't having a lot of drainage (if the wound is dry)  Cover with gauze  Secure with Josiah and tape  Change dressing 3 times a week     Surepress  Apply compression wrap to your affected Leg(s) from mid-foot to knee making sure to cover the heel. Apply in the morning and re-wrap as needed during the day if wrap becomes loose. Remove at bedtime and elevate legs or lie down.  Avoid prolonged standing in one place.  Elevate leg(s) above the level of the heart when sitting or as much as possible.        Watch for signs of infection (redness,warmth to the touch, increased pain, odor, pus, swelling, fever, chills, nausea, vomiting). If you notice any of these call the wound center or proceed to the ER.     Standing Status:   Future     Expiration Date:   7/29/2025

## 2025-07-29 ENCOUNTER — OFFICE VISIT (OUTPATIENT)
Facility: CLINIC | Age: 65
End: 2025-07-29
Payer: COMMERCIAL

## 2025-07-29 VITALS
HEART RATE: 81 BPM | SYSTOLIC BLOOD PRESSURE: 104 MMHG | DIASTOLIC BLOOD PRESSURE: 71 MMHG | TEMPERATURE: 97.5 F | RESPIRATION RATE: 18 BRPM

## 2025-07-29 DIAGNOSIS — Z87.81 S/P ORIF (OPEN REDUCTION INTERNAL FIXATION) FRACTURE: ICD-10-CM

## 2025-07-29 DIAGNOSIS — Z98.890 S/P ORIF (OPEN REDUCTION INTERNAL FIXATION) FRACTURE: ICD-10-CM

## 2025-07-29 DIAGNOSIS — T81.89XA DELAYED SURGICAL WOUND HEALING, INITIAL ENCOUNTER: Primary | ICD-10-CM

## 2025-07-29 PROCEDURE — 97597 DBRDMT OPN WND 1ST 20 CM/<: CPT | Performed by: FAMILY MEDICINE

## 2025-07-29 RX ORDER — LIDOCAINE 40 MG/G
CREAM TOPICAL ONCE
Status: COMPLETED | OUTPATIENT
Start: 2025-07-29 | End: 2025-07-29

## 2025-07-29 RX ADMIN — LIDOCAINE 1 APPLICATION: 40 CREAM TOPICAL at 14:52

## 2025-08-05 ENCOUNTER — OFFICE VISIT (OUTPATIENT)
Facility: CLINIC | Age: 65
End: 2025-08-05
Payer: COMMERCIAL

## 2025-08-05 VITALS
RESPIRATION RATE: 18 BRPM | SYSTOLIC BLOOD PRESSURE: 119 MMHG | HEART RATE: 85 BPM | TEMPERATURE: 98.7 F | DIASTOLIC BLOOD PRESSURE: 70 MMHG

## 2025-08-05 DIAGNOSIS — Z87.81 S/P ORIF (OPEN REDUCTION INTERNAL FIXATION) FRACTURE: ICD-10-CM

## 2025-08-05 DIAGNOSIS — T81.89XA DELAYED SURGICAL WOUND HEALING, INITIAL ENCOUNTER: Primary | ICD-10-CM

## 2025-08-05 DIAGNOSIS — Z98.890 S/P ORIF (OPEN REDUCTION INTERNAL FIXATION) FRACTURE: ICD-10-CM

## 2025-08-05 PROCEDURE — 99213 OFFICE O/P EST LOW 20 MIN: CPT | Performed by: FAMILY MEDICINE

## 2025-08-05 PROCEDURE — 99212 OFFICE O/P EST SF 10 MIN: CPT | Performed by: FAMILY MEDICINE

## 2025-08-05 RX ORDER — LIDOCAINE 40 MG/G
CREAM TOPICAL ONCE
Status: COMPLETED | OUTPATIENT
Start: 2025-08-05 | End: 2025-08-05

## 2025-08-05 RX ADMIN — LIDOCAINE: 40 CREAM TOPICAL at 14:50

## 2025-08-08 ENCOUNTER — OFFICE VISIT (OUTPATIENT)
Dept: OBGYN CLINIC | Facility: CLINIC | Age: 65
End: 2025-08-08
Payer: COMMERCIAL

## 2025-08-08 ENCOUNTER — HOSPITAL ENCOUNTER (OUTPATIENT)
Dept: RADIOLOGY | Facility: CLINIC | Age: 65
End: 2025-08-08
Attending: ORTHOPAEDIC SURGERY
Payer: COMMERCIAL

## 2025-08-08 VITALS — BODY MASS INDEX: 34.55 KG/M2 | WEIGHT: 176 LBS | HEIGHT: 60 IN

## 2025-08-08 DIAGNOSIS — S82.841D CLOSED DISPLACED BIMALLEOLAR FRACTURE OF RIGHT ANKLE WITH ROUTINE HEALING, SUBSEQUENT ENCOUNTER: Primary | ICD-10-CM

## 2025-08-08 PROCEDURE — 99213 OFFICE O/P EST LOW 20 MIN: CPT | Performed by: ORTHOPAEDIC SURGERY

## 2025-08-13 ENCOUNTER — OFFICE VISIT (OUTPATIENT)
Facility: CLINIC | Age: 65
End: 2025-08-13
Payer: COMMERCIAL

## (undated) DEVICE — ALL PURPOSE SPONGES,NON-WOVEN, 4 PLY: Brand: CURITY

## (undated) DEVICE — GLOVE SRG BIOGEL ECLIPSE 8

## (undated) DEVICE — DRAPE STERI 1010 18IN X 12IN

## (undated) DEVICE — SPLINT COMFORT 4 X 30

## (undated) DEVICE — PAD CAST 4 IN COTTON NON STERILE

## (undated) DEVICE — PREP SURGICAL PURPREP 26ML

## (undated) DEVICE — SCD SEQUENTIAL COMPRESSION COMFORT SLEEVE MEDIUM KNEE LENGTH: Brand: KENDALL SCD

## (undated) DEVICE — OCCLUSIVE GAUZE STRIP,3% BISMUTH TRIBROMOPHENATE IN PETROLATUM BLEND: Brand: XEROFORM

## (undated) DEVICE — BETHLEHEM UNIVERSAL  MIONR EXT: Brand: CARDINAL HEALTH

## (undated) DEVICE — GLOVE SRG BIOGEL ECLIPSE 7.5

## (undated) DEVICE — 2.7MM CANNULATED DRILL BIT/QC 160MM

## (undated) DEVICE — 10FR FRAZIER SUCTION HANDLE: Brand: CARDINAL HEALTH

## (undated) DEVICE — SUT VICRYL 2-0 CP-1 27 IN J266H

## (undated) DEVICE — MEDI-VAC YANKAUER SUCTION HANDLE W/STRAIGHT TIP & CONTROL VENT: Brand: CARDINAL HEALTH

## (undated) DEVICE — C-ARM: Brand: UNBRANDED

## (undated) DEVICE — INTENDED FOR TISSUE SEPARATION, AND OTHER PROCEDURES THAT REQUIRE A SHARP SURGICAL BLADE TO PUNCTURE OR CUT.: Brand: BARD-PARKER SAFETY BLADES SIZE 15, STERILE

## (undated) DEVICE — NEEDLE 18 G X 1 1/2 SAFETY

## (undated) DEVICE — GLOVE SRG BIOGEL 8

## (undated) DEVICE — ACE WRAP 4 IN STERILE

## (undated) DEVICE — THREE-QUARTER SHEET: Brand: CONVERTORS

## (undated) DEVICE — 2.0MM DRILL BIT WITH DEPTH MARK/QC/140MM

## (undated) DEVICE — GLOVE INDICATOR PI UNDERGLOVE SZ 8 BLUE

## (undated) DEVICE — EXOFIN PRECISION PEN HIGH VISCOSITY TOPICAL SKIN ADHESIVE: Brand: EXOFIN PRECISION PEN, 1G

## (undated) DEVICE — BANDAGE, ESMARK LF STR 6"X9' (20/CS): Brand: CYPRESS

## (undated) DEVICE — TELFA NON-ADHERENT ABSORBENT DRESSING: Brand: TELFA

## (undated) DEVICE — GLOVE INDICATOR PI UNDERGLOVE SZ 7.5 BLUE

## (undated) DEVICE — SUT ETHILON 3-0 PS-1 18 IN 1663G

## (undated) DEVICE — POV-IOD SOLUTION 4OZ BT

## (undated) DEVICE — SUT VICRYL 0 CT-1 27 IN J260H

## (undated) DEVICE — 1.25MM NON-THREADED GUIDE WIRE 150MM
Type: IMPLANTABLE DEVICE | Site: ANKLE | Status: NON-FUNCTIONAL
Removed: 2025-04-22

## (undated) DEVICE — 2.5MM DRILL BIT/QC/GOLD/110MM

## (undated) DEVICE — CUFF TOURNIQUET 34 X 4 IN QUICK CONNECT DISP 1BLA

## (undated) DEVICE — CAST PADDING 4 IN SYNTHETIC STRL

## (undated) DEVICE — ACE WRAP 6 IN STERILE

## (undated) DEVICE — NEPTUNE E-SEP SMOKE EVACUATION PENCIL, COATED, 70MM BLADE, PUSH BUTTON SWITCH: Brand: NEPTUNE E-SEP